# Patient Record
Sex: FEMALE | Race: BLACK OR AFRICAN AMERICAN | NOT HISPANIC OR LATINO | URBAN - METROPOLITAN AREA
[De-identification: names, ages, dates, MRNs, and addresses within clinical notes are randomized per-mention and may not be internally consistent; named-entity substitution may affect disease eponyms.]

---

## 2023-11-07 PROBLEM — Z01.818 PREOPERATIVE CLEARANCE: Status: ACTIVE | Noted: 2023-11-07

## 2023-11-07 PROBLEM — Z13.21 ENCOUNTER FOR VITAMIN DEFICIENCY SCREENING: Status: ACTIVE | Noted: 2023-11-07

## 2023-11-07 PROBLEM — Z98.84 BARIATRIC SURGERY STATUS: Status: ACTIVE | Noted: 2023-11-07

## 2023-11-07 PROBLEM — E66.01 MORBID OBESITY (MULTI): Status: ACTIVE | Noted: 2023-11-07

## 2023-11-07 NOTE — PROGRESS NOTES
Subjective   Date: 11/7/2023 Time: 6:42 PM  Name: Nhi Hua  MRN: [id number]      This is a 28 y.o. female with morbid obesity @BMI@ who presents to clinic for consideration of bariatric surgery. she has attempted and failed multiple diet and exercise regimens for weight loss. Initial Onset of obesity was at age 20's after starting Depo Injections .  Their goal for surgery is to  be healthier . The patient has tried multiple diets to lose weight including  Boot Camps Exercise & Diets . The patient was most successful with the  Boot Camps around 20 lbs but regain once stopped . The most pounds lost on this diet were 20 lbs. The patient considers their dietary weakness to be carbs, fast foods, portions combination The patient reports a  highest weight ever of 270 pounds and lowest weight ever of 250 pounds Distribution of Obesity: is general. Current diet: . Compliance: General Adherence Diet Problems:  feels is well rounded, not a lot of fruit  } Dietary Details Include:Dietary Details: lots of chicken and shrimp.  The patient does not exercise  walks around at hospital works at  Types of Exercise : walking  She notes portions are biggest weakness.  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    Comorbidities: back pain    Menstrual History: irregular    Derrick-en-Y Gastric Bypass      Off PPI for never (how long)    How bad is the heartburn? 0 = No symptoms  Heartburn when lying down? 0 = No symptoms  Heartburn when standing up? 0 = No symptoms  Heartburn after meals? 0 = No symptoms  Does heartburn change your diet? 0 = No symptoms  Does heartburn wake you from sleep? 0 = No symptoms  Do you have difficulty swallowing? 0 = No symptoms  Do you have pain with swallowing? 0 = No symptoms  If you take medication, does this affect your daily life? 0 = No symptoms  How bad is the regurgitation? 0 = No symptoms  Regurgitation when lying down? 0 = No symptoms  Regurgitation when standing up? 0 = No  "symptoms  Regurgitation after meals? 0 = No symptoms  Does regurgitation change your diet? 0 = No symptoms  Does regurgitation wake you from sleep? 0 = No symptoms  How satisfied are you with your present condition? Satisfied    PMH: History reviewed. No pertinent past medical history.     PSH: Denies Surgical Hx    FAMILY HISTORY:  Father:  Diabetes, HTN  Mother:  History Blood Clots    SOCIAL HISTORY:   Smoke Marijuana Daily (not prescribed, no medical card)  Drinks:  socially.    MEDICATIONS:  Prior to Admission Medications:  Medication Documentation Review Audit    **Prior to Admission medications have not yet been reviewed**          ALLERGIES:  Allergies   Allergen Reactions    Penicillins Anaphylaxis, Rash and Itching   itching    REVIEW OF SYSTEMS:  GENERAL: Negative for malaise, significant weight loss and fever  HEAD: Negative for headache, swelling.  NECK: Negative for lumps, goiter, pain and significant neck swelling  RESPIRATORY: Negative for cough, wheezing or shortness of breath.  CARDIOVASCULAR: Negative for chest pain, leg swelling or palpitations.  GI: Negative for abdominal discomfort, blood in stools or black stools or change in bowel habits  : No history of dysuria, frequency or incontinence  MUSCULOSKELETAL: Negative for joint pain or swelling, back pain or muscle pain.  SKIN: Negative for lesions, rash, and itching.  PSYCH: Negative for sleep disturbance, mood disorder and recent psychosocial stressors.  ENDOCRINE: Negative for cold or heat intolerance, polyuria, polydipsia and goiter.    Objective   PHYSICAL EXAM:  Visit Vitals  /81   Pulse 74   Ht 1.575 m (5' 2\")   Wt 115 kg (253 lb)   BMI 46.27 kg/m²   Smoking Status Never   BSA 2.24 m²     General appearance: obese, NAD  Neuro: AOx3  Head: EOMI; no swelling or lesions of scalp or face  ENT:  no lumps or lymphadenopathy, thyroid normal to palpation; oropharynx clear, no swelling or erythema  Skin: warm, no erythema or rashes  Lungs: " clear to percussion and auscultation  Heart: regular rhythm and S1, S2 normal  Abdomen: soft, non-tender, no masses, no organomegaly  Extremities: Normal exam of the extremities. No swelling or pain.  Psych: no hurried speech, no flight of ideas, normal affect    IMPRESSION:  Nhi Hua is a 28 y.o. female with 46 with the following diagnoses and co-morbidities: back pain and large breasts.      We discussed the bypass  at [facility] and all questions were answered. risks and benefits were discussed  The patient understands the risks and benefits of the procedure and how the procedure is performed. The patient understands the risks include but are not limited to bleeding, infection, DVT, PE, pneumonia, myocardial infarction, leak along the staple lines, and weight regain. We discussed lifestyle changes necessary to be successful.        She is a great candidate for weight loss surgery    This patient does meet the criteria for a surgical weight loss procedure according to NIH guidelines.  The risks of sleeve gastrectomy, Derrick-en-Y gastric bypass, and duodenal switch surgery including bleeding, leak, wound infection, dehydration, ulcers, internal hernia, DVT/PE, prolonged nausea/vomiting, incomplete resolution of associated medical conditions, reflux, weight regain, vitamin/mineral deficiencies, and death have been explained to the patient and Nhi Hua has expressed understanding and acceptance of them.     The increased risk of substance and alcohol abuse following bariatric surgery was discussed with the patient, along with the negative consequences of substance/alcohol use after surgery including addiction, worsening of mental health disorders, and injury to the stomach. The risk of smoking and vaping (tobacco or any other substance) after bariatric surgery was explained to the patient. This includes risk of anastamotic ulcers, gastritis, bleeding, perforation, stricture, and PO intolerance.  The patient  expressed understanding and acceptance of these risks.        The benefits of the above surgeries including weight loss, improvement/resolution of associated medical and mental health conditions, improved mobility, and decreased mortality have been explained the the patient and Nhi Hua has expressed understanding and acceptance of them.  Assessment/Plan   PLAN:  The plan of treatment for Nhi Hua is to continue with the consultations and tests ordered today in hopes of qualifying for pre-operative clearance for bariatric surgery. This includes:    Consult Nutrition for education and MSWL  Consult Psychology  Consult Physical Therapy for limited mobility  Consult cardiology  Consult pulmonology  Labs/CXR/EKG ordered  EGD  PCP for medical optimization  Consult sleep medicine - concern for RENETTA    The following are some lifestyle changes you should begin to prepare you for your bypass surgery.   Eliminate soda and other carbonated beverages from your diet. Carbonation will not be well tolerated after surgery. Try Propel, Vitamin Water Zero, Sobe Lifewater, Crystal Light or water.    Increase fluid consumption to 64 oz daily. Do not drink within 30 minutes of eating as this will liquefy your food and make you hungry more quickly.    Exercise for 30-60 minutes daily. Brisk walking, bike riding and swimming are all examples of healthy exercise. If you are unable to exercise we recommend seated exercise.    Do not skip meals.    Take a multivitamin daily.    Lose weight. In preparation for your surgery it is important that you begin making healthier food choices now. Our dietitian will meet with you to help you select foods lower in calories and higher in nutrition. We would like you to lose at least 10  lbs prior to surgery.     Increase your protein intake to 60 grams per day.    Alcohol is empty calories. Please eliminate while preparing for surgery.    Plan your meals.      General Instruction: 1) Use the  information we gave you today to work through your insurance requirements and medical clearances.   2) These documents need to get faxed to the program navigators so they can submit them for approval from your insurance company.   3) Obtain labs today at a  facility. We will call you with any abnormalities and corrections you need to make.   4) Continue to work with your primary care doctor and other specialist so your other health problems are well controlled prior to your surgery.   5) Adopt the recommendations of the program dietician so you develop healthy eating patterns.   6) Work with the sleep team to get your sleep apnea treated to prevent other health problems .   7) Consider attending a support group to learn from other who have been through the process.   8) Come to the MSWL sessions.   45 minutes were spent with patient including history, physical exam, and education.

## 2023-11-08 ENCOUNTER — OFFICE VISIT (OUTPATIENT)
Dept: SURGERY | Facility: CLINIC | Age: 28
End: 2023-11-08
Payer: COMMERCIAL

## 2023-11-08 ENCOUNTER — NUTRITION (OUTPATIENT)
Dept: SURGERY | Facility: CLINIC | Age: 28
End: 2023-11-08
Payer: COMMERCIAL

## 2023-11-08 VITALS
WEIGHT: 253 LBS | HEIGHT: 62 IN | HEART RATE: 74 BPM | SYSTOLIC BLOOD PRESSURE: 128 MMHG | BODY MASS INDEX: 46.56 KG/M2 | DIASTOLIC BLOOD PRESSURE: 81 MMHG

## 2023-11-08 VITALS — WEIGHT: 261 LBS | HEIGHT: 62 IN | BODY MASS INDEX: 48.03 KG/M2

## 2023-11-08 DIAGNOSIS — Z98.84 BARIATRIC SURGERY STATUS: ICD-10-CM

## 2023-11-08 DIAGNOSIS — Z13.21 ENCOUNTER FOR VITAMIN DEFICIENCY SCREENING: ICD-10-CM

## 2023-11-08 DIAGNOSIS — E66.01 MORBID OBESITY (MULTI): ICD-10-CM

## 2023-11-08 DIAGNOSIS — Z01.818 PREOPERATIVE CLEARANCE: ICD-10-CM

## 2023-11-08 PROCEDURE — 99205 OFFICE O/P NEW HI 60 MIN: CPT | Performed by: SURGERY

## 2023-11-08 PROCEDURE — 1036F TOBACCO NON-USER: CPT | Performed by: SURGERY

## 2023-11-08 PROCEDURE — 99215 OFFICE O/P EST HI 40 MIN: CPT | Performed by: SURGERY

## 2023-11-08 RX ORDER — ALBUTEROL SULFATE 90 UG/1
2 AEROSOL, METERED RESPIRATORY (INHALATION) EVERY 4 HOURS PRN
COMMUNITY
Start: 2023-10-18 | End: 2024-10-17

## 2023-11-08 RX ORDER — FLUTICASONE PROPIONATE 110 UG/1
2 AEROSOL, METERED RESPIRATORY (INHALATION) 2 TIMES DAILY
COMMUNITY
Start: 2023-10-18 | End: 2024-05-14

## 2023-11-08 RX ORDER — LANOLIN ALCOHOL/MO/W.PET/CERES
1000 CREAM (GRAM) TOPICAL
COMMUNITY
Start: 2023-02-09

## 2023-11-08 RX ORDER — B-COMPLEX WITH VITAMIN C
TABLET ORAL
COMMUNITY
Start: 2023-11-01 | End: 2024-02-22 | Stop reason: SDUPTHER

## 2023-11-08 RX ORDER — ERGOCALCIFEROL 1.25 MG/1
1 CAPSULE ORAL
COMMUNITY
Start: 2023-10-18 | End: 2024-01-15

## 2023-11-08 ASSESSMENT — LIFESTYLE VARIABLES
AUDIT-C TOTAL SCORE: 1
SKIP TO QUESTIONS 9-10: 1
HOW MANY STANDARD DRINKS CONTAINING ALCOHOL DO YOU HAVE ON A TYPICAL DAY: 1 OR 2
HOW OFTEN DO YOU HAVE A DRINK CONTAINING ALCOHOL: MONTHLY OR LESS
HOW OFTEN DO YOU HAVE SIX OR MORE DRINKS ON ONE OCCASION: NEVER

## 2023-11-08 NOTE — PROGRESS NOTES
"PREOPERATIVE, MULTIDISCIPLINARY, MEDICALLY SUPERVISED, REDUCED CALORIE DIET, BEHAVIOR MODIFICATION AND EXERCISE PROGRAM    S:  Patient has not meet with surgeon yet- has an appt today. Main fluid is water, cranberry juice, inerrable on occasion, lemonade on occasion. Reviewed the correct fluids for post-op. Patient is eating and drinking at same time. Patient was explained the MSWL requirement today in detail.     O:    Wt:WEIGHT@       Ht:    1.575 m (5' 2\")          BMI: BMI@    Goal: 5% body weight loss over the course of program    Dietary recommendation:   1. Eliminate high calorie, carbonated, and caffeinated beverages  2. Practice the 30-30-30 rule by drinking between meals.  3. Structure your meal plan - have 3 meals and 1 snack daily.  4. Have balanced meals that always contain a good source of protein.  5. Increase intake of non-starchy vegetables.  Have 5 servings fruits and vegetables daily.   6. Take a multivitamin daily.  7. Increase physical activity by 10-15 minutes to an end goal of 60 minutes 5 x per week.    Group Topic: Healthy Holiday Meal Planning    Behavioral recommendation: Patient is encouraged to choose healthy foods, along with ways to modify recipes as part of meal planning during the holiday season.     A/P: Pt appears to have a good understanding of how to incorporate health foods as part of holiday meal plans into her daily meal pattern.  Pt has a goal to consume Healthier choices as part of the holiday meal plans in their appropriate portion sizes.    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   Goal is increase steps by 500 each week- long term goal is 10,000 steps a day.     Nadege Prado, FAUSTINO, LD  "

## 2023-11-08 NOTE — PATIENT INSTRUCTIONS
The following are some lifestyle changes you should begin to prepare you for your bypass surgery.   Eliminate soda and other carbonated beverages from your diet. Carbonation will not be well tolerated after surgery. Try Propel, Vitamin Water Zero, Sobe Lifewater, Crystal Light or water.    Increase fluid consumption to 64 oz daily. Do not drink within 30 minutes of eating as this will liquefy your food and make you hungry more quickly.    Exercise for 30-60 minutes daily. Brisk walking, bike riding and swimming are all examples of healthy exercise. If you are unable to exercise we recommend seated exercise.    Do not skip meals.    Take a multivitamin daily.    Lose weight. In preparation for your surgery it is important that you begin making healthier food choices now. Our dietitian will meet with you to help you select foods lower in calories and higher in nutrition. We would like you to lose at least 10  lbs prior to surgery.     Increase your protein intake to 60 grams per day.    Alcohol is empty calories. Please eliminate while preparing for surgery.    Plan your meals.      General Instruction: 1) Use the information we gave you today to work through your insurance requirements and medical clearances.   2) These documents need to get faxed to the program navigators so they can submit them for approval from your insurance company.   3) Obtain labs today at a  facility. We will call you with any abnormalities and corrections you need to make.   4) Continue to work with your primary care doctor and other specialist so your other health problems are well controlled prior to your surgery.   5) Adopt the recommendations of the program dietician so you develop healthy eating patterns.   6) Work with the sleep team to get your sleep apnea treated to prevent other health problems .   7) Consider attending a support group to learn from other who have been through the process.   8) Come to the MSL  sessions.

## 2023-11-17 PROBLEM — E55.9 VITAMIN D INSUFFICIENCY: Status: ACTIVE | Noted: 2023-07-20

## 2023-11-20 ENCOUNTER — APPOINTMENT (OUTPATIENT)
Dept: CARDIOLOGY | Facility: CLINIC | Age: 28
End: 2023-11-20
Payer: COMMERCIAL

## 2023-12-05 ENCOUNTER — OFFICE VISIT (OUTPATIENT)
Dept: CARDIOLOGY | Facility: CLINIC | Age: 28
End: 2023-12-05
Payer: COMMERCIAL

## 2023-12-05 VITALS
BODY MASS INDEX: 47.11 KG/M2 | OXYGEN SATURATION: 97 % | WEIGHT: 256 LBS | SYSTOLIC BLOOD PRESSURE: 154 MMHG | HEIGHT: 62 IN | HEART RATE: 68 BPM | DIASTOLIC BLOOD PRESSURE: 84 MMHG

## 2023-12-05 DIAGNOSIS — E66.01 MORBID OBESITY (MULTI): ICD-10-CM

## 2023-12-05 DIAGNOSIS — J45.909 ASTHMA, UNSPECIFIED ASTHMA SEVERITY, UNSPECIFIED WHETHER COMPLICATED, UNSPECIFIED WHETHER PERSISTENT (HHS-HCC): ICD-10-CM

## 2023-12-05 DIAGNOSIS — Z98.84 BARIATRIC SURGERY STATUS: ICD-10-CM

## 2023-12-05 DIAGNOSIS — Z01.818 PREOPERATIVE CLEARANCE: Primary | ICD-10-CM

## 2023-12-05 DIAGNOSIS — E55.9 VITAMIN D INSUFFICIENCY: ICD-10-CM

## 2023-12-05 PROCEDURE — 93000 ELECTROCARDIOGRAM COMPLETE: CPT | Performed by: INTERNAL MEDICINE

## 2023-12-05 PROCEDURE — 1036F TOBACCO NON-USER: CPT | Performed by: INTERNAL MEDICINE

## 2023-12-05 PROCEDURE — 99203 OFFICE O/P NEW LOW 30 MIN: CPT | Performed by: INTERNAL MEDICINE

## 2023-12-05 NOTE — PROGRESS NOTES
"  Vicente Hua  is a 28 y.o. year old female who presents for bariatric surgery preoperative evlauation    Blood pressure 154/84, pulse 68, height 1.575 m (5' 2\"), weight 116 kg (256 lb), SpO2 97 %.   Penicillins  No past medical history on file.  Past Surgical History:   Procedure Laterality Date    DILATION AND CURETTAGE OF UTERUS       Family History   Problem Relation Name Age of Onset    Blood clot Mother      Diabetes Father      Hypertension Father       @SOC    Current Outpatient Medications   Medication Sig Dispense Refill    albuterol 90 mcg/actuation inhaler Inhale 2 puffs every 4 hours if needed.      cyanocobalamin (Vitamin B-12) 1,000 mcg tablet Take 1 tablet (1,000 mcg) by mouth once daily.      ergocalciferol (Vitamin D-2) 1.25 MG (81391 UT) capsule Take 1 capsule (1,250 mcg) by mouth 1 (one) time per week.      fluticasone (Flovent) 110 mcg/actuation inhaler Inhale 2 puffs twice a day.      PNV no.163-iron-folate no.10 20 mg iron- 1 mg tablet Take 1 tablet by mouth once daily.      Prenatal Vitamin 27 mg iron- 0.8 mg tablet        No current facility-administered medications for this visit.        ROS  Review of Systems    Physical Exam  Physical Exam     EKG  Encounter Date: 12/05/23   ECG 12 Lead    Narrative    NSR at 68/min., WNL       Problem List Items Addressed This Visit       Bariatric surgery status    Relevant Orders    ECG 12 Lead (Completed)    Preoperative clearance - Primary    Morbid obesity (CMS/HCC)     7/19/23 Tchol = 150, HDL = 34, LDL = 83         Vitamin D insufficiency    Asthma         No follow-ups on file.  Proceed with bariatric surgery      Akira Mitchell MD   "

## 2023-12-06 ENCOUNTER — NUTRITION (OUTPATIENT)
Dept: SURGERY | Facility: CLINIC | Age: 28
End: 2023-12-06
Payer: COMMERCIAL

## 2023-12-06 VITALS — BODY MASS INDEX: 46.46 KG/M2 | WEIGHT: 254 LBS

## 2023-12-06 NOTE — PROGRESS NOTES
Initial Bariatric Nutrition Assessment    Surgeon:   Carlos   Patient is considering: RNYGB    ASSESSMENT:  Current weight:   Vitals:    12/06/23 1000   Weight: 115 kg (254 lb)     Ht:      BMI:  Body mass index is 46.46 kg/m².        Initial start weight:   #254  Pre-Op Excess Body Weight (EBW):   #144    Target Post-Op weight goal:    #139-167    Food allergies/intolerances:   No  Chewing/Swallowing/Dentition: No  Nausea / Vomiting / Hx Gastroparesis:  No   Diarrhea/ Constipation: No   Smoking/Tobacco use: yes- aware of need to quit   Vitamins/Minerals supplements: None   Hours of sleep/night: 6-7 hrs   Diet History: boot camps, low carb   Activity Level:walking- tracking steps- 6,000 steps a day     24 HOUR RECALL/DIET HISTORY:  Breakfast:  greek flips yogurt   Snack:  none   Lunch: salad chicken and beans with or skips on occasion   Snack: cheeztiz or none   Dinner: chicken, rice and green beans or cereal or noodles   Snack: none   Beverages: water, juice on occasion   Alcohol: wine or liqure 2 times a week     Person responsible for cooking & shopping?   You   How often do you eat sweet snacks?   Rare   How often do you eat savory snacks?  On occasion   How often do you eat out?   4 times a week   Do you feel overly stuffed?   No   Binge Eating?  No  Night Eating?  No   Emotional Eating?  No        READINESS TO LEARN:  Motivation to learn: Interested        Understanding of instruction: Good      Anticipated Compliance: Good         Family Support: U/A           Educational Materials Provided:    Pre-op Diet and sample menus                                             Nutrition Guidelines for Gastric Bypass and Sleeve Gastrectomy   Schedules for MSWL class and support group     Nutrition assessment completed today. Patient is seeking RNYGB. Is practicing  the 30-30-30 rule. Admits it hard at times but is trying daily. Is drinking at least 64oz a day plus. Main fluid is water. Has not tried the diet juice yet.  Is meal prepping for 3 meals a week. Lives by herself. Patient  is eating 1-2 meals a day and snacks are rare. Listening to body signals to help with portion control. Protein is at most meals when eating. Movement is walking- tracking steps- 6,000 steps a day. Reviewed again the MSWL requirements. Emailed nutrition on to patient- confirmed receipt of info.     Patient was receptive to nutritional recommendations, asked numerous questions, and verbalized understanding of the weight loss surgery diet.  Patient expressed understanding about the importance of strict dietary compliance post-surgery to avoid nutritional deficiencies and achieve optimal weight loss and verbalized intent to follow dietary recommendations.    Malnutrition Screening:   Significant unintentional weight loss? n/a   Eating less than 75% of usual intake for more than 2 weeks? n/a      Nutrition Diagnosis:   Overweight/obesity related to excess energy intake as evidenced by BMI >= 40 kg/m^2.  Food- and nutrition-related knowledge deficit related to lack of prior exposure to surgical weight loss information as evidenced by pt new to surgical program.    Nutrition Interventions:   Modify type and amount of food and nutrients within meals and snacks.  Comprehensive Nutrition Education    Recommendations:  1. Structure meal patterns, eating three meals and 1-2 snacks per day. Try to use a protein shake if needed for a meal replacement. No skipping meals.   2.    Have a good source of protein first at each meal & snack.  Aim for 60-70 grams/day.  3.    Continue to drink 64oz of calorie-free, caffeine-free, and non-carbonated beverages-water, try there diet juice.    4.    Continue to practice these: Stop drinking 30 minutes before meals, nothing with meals and wait 30 minutes after meals to drink again. Make meals last 30 minutes-chew thoroughly.   5.   Increase physical activity by 10-15 minutes as tolerated to an end goal of 60 minutes 5 x per week.  Consistency is the key. Continue to track steps on apple watch. Goal is 10,00 steps a day.     Pre-op Goal weight: lose 5% of body weight    Nutrition Monitoring and Evaluation: 1-2 pound weight loss per week  Criteria: weight check  Need for Follow-up: 4 week (vitamins) #3 of 6     Patient does meet National Institutes Health guidelines for weight loss surgery, however needs to demonstrate consistent effort in making dietary changes before giving clearance. It is anticipated that the patient will need at least 4 nutritional follow-up visits prior to clearance for surgery.        Nadege Prado RDN, LD

## 2023-12-15 ENCOUNTER — DOCUMENTATION (OUTPATIENT)
Dept: SURGERY | Facility: HOSPITAL | Age: 28
End: 2023-12-15
Payer: COMMERCIAL

## 2024-01-11 ENCOUNTER — NUTRITION (OUTPATIENT)
Dept: SURGERY | Facility: CLINIC | Age: 29
End: 2024-01-11
Payer: COMMERCIAL

## 2024-01-11 NOTE — PROGRESS NOTES
Follow-up Pre-op Bariatric Nutrition Assessment    Surgeon:   Carlos   Patient is considering: RNYGB     ASSESSMENT:  Current weight: U/A        Initial start weight:   #254  Pre-Op Excess Body Weight (EBW):   #144    Target Post-Op weight goal:  #139-167       Nutrition Interventions for last encounter (date): 12-6-2023   Structure meal patterns, eating three meals and 1-2 snacks per day. Try to use a protein shake if needed for a meal replacement. No skipping meals.   2.    Have a good source of protein first at each meal & snack.  Aim for 60-70 grams/day.  3.    Continue to drink 64oz of calorie-free, caffeine-free, and non-carbonated beverages-water, try there diet juice.    4.    Continue to practice these: Stop drinking 30 minutes before meals, nothing with meals and wait 30 minutes after meals to drink again. Make meals last 30 minutes-chew thoroughly.   5.   Increase physical activity by 10-15 minutes as tolerated to an end goal of 60 minutes 5 x per week. Consistency is the key. Continue to track steps on apple watch. Goal is 10,00 steps a day.     24 HOUR RECALL/DIET HISTORY:  Breakfast:  boiled egg, yogurt and strawberry toast   Snack:  none   Lunch: salad with chicken or beans on chipotle   Snack: tuna and crackers   Dinner: pepper steak with yellow rice and broccoli or greek yogurt   Snack: none   Beverages: water, juice, hint water   Alcohol: none        READINESS TO LEARN:  Motivation to learn: Interested        Understanding of instruction: Good      Anticipated Compliance: Good          Family Support: None           Educational Materials Provided:    None     Nutrition follow-up assessment completed today. Patient is seeking RNYGB. Trying to have smaller portions. Protein is at all meals. Meals are consistent now- no more skipping meals. Has a protein shake at home to try but higher in kcals and less protein then recommended. Has not printed out the education material- will do today at work.  Is  wearing Apple watch more often- close to closing all rings most days.  Also joined a gym this week. Drinking juice still. But has tried some other flavored water. Patient is taking vitamin D, prenatal MVI, vitamin B12, iron.  Patient is aware of the MSWL requirements and below nutrition goals. Will obtain a weight for next F/U session.     Patient was receptive to nutritional recommendations, asked numerous questions, and verbalized understanding of the weight loss surgery diet.  Patient expressed understanding about the importance of strict dietary compliance post-surgery to avoid nutritional deficiencies and achieve optimal weight loss and verbalized intent to follow dietary recommendations.    Malnutrition Screening:   Significant unintentional weight loss? n/a   Eating less than 75% of usual intake for more than 2 weeks? n/a      Nutrition Diagnosis:   Overweight/obesity related to excess energy intake as evidenced by BMI >= 40 kg/m^2.  Food- and nutrition-related knowledge deficit related to lack of prior exposure to surgical weight loss information as evidenced by pt new to surgical program.    Nutrition Interventions:   Modify type and amount of food and nutrients within meals and snacks.  Comprehensive Nutrition Education    Recommendations:  Continue with structure meal patterns, eating three meals and 1-2 snacks per day.  Refer for the nutrition handout for protein shake options- do  not rebuy the one you have now  3.  Continue to have a good source of protein first at each meal & snack.  Aim for 60-70 grams/day.  4. Continue to drink 64oz of calorie-free, caffeine-free, and non-carbonated beverages- water, flavored water, try now to dilute the juice (1/2 juice, 1/2 water)    5. Continue to work on these: Stop drinking 30 minutes before meals, nothing with meals and wait 30 minutes after meals to drink again. Make meals last 30 minutes-chew thoroughly.   6... Continue with prenatal MVI, vitamin B12, iron.  START 1200-1500mg of calcium citrate per day (500-600mg at lunch, dinner AND before bed),- chewable form    7. Continue to wear the apple watch-close the rings or get to the gym to close the rings.  8. Get a weight for next F/U and print out nutrition material         Pre-op Goal weight: lose 5% of body weight    Nutrition Monitoring and Evaluation: 1-2 pound weight loss per week  Criteria: weight check  Need for Follow-up: 4 week (#4 of 6)     Patient does meet National Institutes Health guidelines for weight loss surgery, however needs to demonstrate consistent effort in making dietary changes before giving clearance. It is anticipated that the patient will need at least 3 nutritional follow-up visits prior to clearance for surgery.      Nadege Prado RDN, LD

## 2024-01-25 ENCOUNTER — DOCUMENTATION (OUTPATIENT)
Dept: SURGERY | Facility: CLINIC | Age: 29
End: 2024-01-25
Payer: COMMERCIAL

## 2024-01-25 NOTE — PROGRESS NOTES
Pt called in and left a vcml asking to be called to schedule. The pt saw the provider in 11/2023. I called the pt and she advised she was attempting to be scheduled for her sleep appointment. I advised her we do not schedule outside of the dept, but I confirmed I see she was able to get her appt scheduled.

## 2024-01-30 ENCOUNTER — APPOINTMENT (OUTPATIENT)
Dept: PULMONOLOGY | Facility: CLINIC | Age: 29
End: 2024-01-30
Payer: COMMERCIAL

## 2024-02-05 ENCOUNTER — APPOINTMENT (OUTPATIENT)
Dept: PULMONOLOGY | Facility: CLINIC | Age: 29
End: 2024-02-05
Payer: COMMERCIAL

## 2024-02-06 ENCOUNTER — TELEMEDICINE (OUTPATIENT)
Dept: BEHAVIORAL HEALTH | Facility: CLINIC | Age: 29
End: 2024-02-06
Payer: COMMERCIAL

## 2024-02-06 DIAGNOSIS — F54 PSYCHOLOGICAL FACTOR AFFECTING PHYSICAL CONDITION: ICD-10-CM

## 2024-02-06 DIAGNOSIS — Z98.84 BARIATRIC SURGERY STATUS: ICD-10-CM

## 2024-02-06 DIAGNOSIS — E66.01 MORBID OBESITY (MULTI): ICD-10-CM

## 2024-02-06 DIAGNOSIS — Z01.818 PREOPERATIVE CLEARANCE: ICD-10-CM

## 2024-02-06 PROCEDURE — 1036F TOBACCO NON-USER: CPT | Performed by: PSYCHOLOGIST

## 2024-02-06 PROCEDURE — 90791 PSYCH DIAGNOSTIC EVALUATION: CPT | Performed by: PSYCHOLOGIST

## 2024-02-06 NOTE — PROGRESS NOTES
[facility]  New Visit Note        HISTORY OF PRESENT ILLNESS     The patient's referring provider is: Ngoc Gonzalez MD MPH    HISTORY OF PRESENT ILLNESS   Nhi Hua is a 28 y.o. female who presents to a [facility] for a sleep medicine evaluation with concerns of No chief complaint on file..     PAST SLEEP HISTORY    Patient has the following sleep-related diagnoses and sleep study results: ***    CURRENT HISTORY    On today's visit, the patient reports ***    STOP  ***  BANG ***    Sleep schedule  on weekdays / work days:  Usual Bedtime  ***  Falls asleep around  ***  Wake time  ***    Sleep schedule  on weekends/non work days :  Usual Bedtime  ***  Falls asleep around ***  Wake time  ***    Naps:   ***    Average sleep duration *** hours/day    Preferred sleeping position: ***    Sleep-related ROS:    Sleep Initiation: {Sleep initiation:38264}    Sleep Maintenance: ***    Breathing during sleep: {Breathing during sleep:75447}    RLS screen:  ***    Sleep-related behaviors:  ***    Recreational drug use  Smoking: ***  Alcohol consumption: ***  Caffeine consumption:  ***  Marijuana: ***    ESS: No data recorded   DEX: ***      REVIEW OF SYSTEMS     All other systems negative      ALLERGIES AND MEDICATIONS     ALLERGIES  Allergies   Allergen Reactions    Penicillins Anaphylaxis, Rash and Itching       MEDICATIONS  Current Outpatient Medications   Medication Sig Dispense Refill    albuterol 90 mcg/actuation inhaler Inhale 2 puffs every 4 hours if needed.      cyanocobalamin (Vitamin B-12) 1,000 mcg tablet Take 1 tablet (1,000 mcg) by mouth once daily.      fluticasone (Flovent) 110 mcg/actuation inhaler Inhale 2 puffs twice a day.      PNV no.163-iron-folate no.10 20 mg iron- 1 mg tablet Take 1 tablet by mouth once daily.      Prenatal Vitamin 27 mg iron- 0.8 mg tablet        No current facility-administered medications for this visit.         PAST  "HISTORY     PAST MEDICAL HISTORY  She  has no past medical history on file.    PAST SURGICAL HISTORY:  Past Surgical History:   Procedure Laterality Date    DILATION AND CURETTAGE OF UTERUS         FAMILY HISTORY  Family History   Problem Relation Name Age of Onset    Blood clot Mother      Diabetes Father      Hypertension Father         SOCIAL HISTORY  She  reports that she has never smoked. She has never used smokeless tobacco. She reports that she does not currently use alcohol. She reports current drug use. Frequency: 7.00 times per week. Drug: Marijuana.       PHYSICAL EXAM     VITAL SIGNS: There were no vitals taken for this visit.     CURRENT WEIGHT: [unfilled]  BMI: [unfilled]  PREVIOUS WEIGHTS:  Wt Readings from Last 3 Encounters:   12/06/23 115 kg (254 lb)   12/05/23 116 kg (256 lb)   11/08/23 115 kg (253 lb)       PHYSICAL EXAM: GENERAL: alert oriented x 3 pleasant and cooperative no acute distress  MODIFIED VASQUEZ SCORE:   MODIFIED MALLAMPATI SCORE:   LATERAL PHARYNGEAL WALL:   TONSILLAR SCORE:   UVULA: midline  TONGUE SCALLOPING: normal  NECK EXAM: normal supple no adenopathy    RESULTS/DATA     No results found for: \"IRON\", \"TRANSFERRIN\", \"IRONSAT\", \"TIBC\", \"FERRITIN\"    ASSESSMENT/PLAN     Ms. Hua is a 28 y.o. female and She was referred to the [facility] for the following issues:                 "

## 2024-02-08 ENCOUNTER — APPOINTMENT (OUTPATIENT)
Dept: SLEEP MEDICINE | Facility: CLINIC | Age: 29
End: 2024-02-08
Payer: COMMERCIAL

## 2024-02-09 NOTE — PROGRESS NOTES
Start time: 1:10pm  End time: 2:00pm    Televideo Informed Consent for psychological evaluation was reviewed with the patient as follows:  There are potential benefits and risks of the use of telephone or video-conferencing that differ from in-person sessions. Specifically, the telephone or televideo system we are using may not be HIPAA compliant and may present limits to patient confidentiality. Confidentiality still applies for telepsychology services, and nobody will record the session without your permission.     Understanding and verbal agreement was attested to by the patient. Patient identity was confirmed using 3 sources, including telephone number, email address and date of birth. Provision of services via telehealth was necessitated by the restrictions on face-to-face visits accompanying the COVID-19 pandemic.    Non-secure Note: The patient has consented to a non-restricted note.    I had the pleasure of seeing ALVARO SEYMOUR at your request for behavioral evaluation for appropriateness for bariatric surgery. As you know, MS. SEYMOUR is a 28 year old who reports a current weight of 253 pounds and a BMI of approximately 46.    MS. SEYMOUR stated that she has all of her clearance appointments scheduled and she has met with the dietitian twice.     WEIGHT HISTORY  MS. SEYMOUR reported that she has struggled with her weight since. She stated that her mother put her into boot camp when she was younger. In high school she weighed around 180lbs and had a more sedentary lifestyle.     She stated that she has attempted the following forms of weight loss: boot camps, exercise (walking), various diets. She described eating habits as the most significant factor that impacts her weight. She stated that her highest weight was around 275 lbs in 2020.     MS. SEYMUOR stated that her appointments with the dietitian are going well. She stated that she is working on 30-30-30, making alternative food choices, smaller portions  and listening to her body. She stated that she is a “picky eater” and will eat the same things. She noted that she has struggled somewhat with the 30-30-30. She reported that she eats a vegetable and protein with every meal she eats. She stated that she is cutting back on her ginger ale and juice intake. She stated that she had some ginger ale last month when she was sick but has significantly reduced this. She is drinking about 4-5 bottles of water per day.     Daily food intake:  Breakfast: yogurt smoothie with an orange   Lunch: Left overs steak broccoli potatoes and shrimp  Dinner: Chicken wings  Snacks: none      PSYCHOSOCIAL HISTORY    MS. SEYMOUR stated that she is working on increasing her steps. She is counting her steps with her apple watch and the goal is 10,000 stops per day. She stated that she is not doing any other exercise or activity. She noted that she needs to get a gym membership.     She noted that she smokes marijuana daily (3-4 blunts per day). She stated that she lost her cousin September and this worsened. She stated that she now smokes about 2 blunts per day and is working on reducing this. She noted that she also eats an edible. She stated that her goal is to completely eliminate this by the end of March beginning of April. She stated that she is not worried about stopping as she has done this before. 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 She stated that she drinks about 7 on the weekends and on Wednesdays she stated that she drinks wine (2 glasses) with her friend. She stated that she is aware that she will not be able to drink alcohol post-surgery. She stated that she has not had concerns related to her alcohol use and she has not had any legal issues related to her alcohol use. She denies use of other substances.     PSYCHOLOGICAL STATUS  MS. SEYMOUR stated that she was diagnosed with depression and she was  prescribed medication by an internal medicine doctor. She stated that she did not recall which medication she was on. She described feeling “numb” and stopped taking this medication. She noted that over time her mood stabilized. She stated that she has never been in therapy.     She processed losing a close family member       Behavioral issues relevant for candidacy for bariatric surgery include:    1) Motivation: MS. SEYMOUR is motivated by a desire to improve her health and to improve her view of self.         IMPRESSIONS  MS. SEYMOUR and I did not have enough time to complete her evaluation. We are scheduled to meet on February 29th at 1pm to complete assessment of the following items:    Substance use assessment  Eating habits  Emotional eating behaviors  Support  Work related history    Additionally, we had an extended discussion about behavioral responses to surgery for which she should be vigilant. We discussed the post-surgical risks of mood deterioration, substance misuse, the development of compulsive behaviors and the development of maladaptive coping responses. She expressed understanding of these risks and agreed to contact our office with appropriate haste if any of these maladaptive responses were to develop after surgery.      Mental Status Examination:    Mental Status Exam:  Orientation:  Alert. Oriented x3.  Memory: intact.  Attention/Concentration: Normal/ Good.  Appearance:  Well-groomed. Casually Dressed. Good hygiene.   Behavior/Attitude: Cooperative. Pleasant. Good eye contact.  Motor: Relaxed. Calm. Normal motor activity.   Speech: Regular rate and volume. Fluent. No pressure.   Mood: Euthymic  Affect: Congruent to stated mood.   Thought process: Goal-directed. Organized.  Thought content: No paranoia, delusion or ideas of reference. No AVH   Suicidal ideation: denied.  Homicidal ideation: denied.   Insight: Good  Judgment: Good  Fund of knowledge: Above Average      Betsy Santacruz  Catracho Lieberman - she  her  hers  Licensed Clinical Psychologist    [facility] [place]   [facility]  Phone: [phone]  [email or web address]

## 2024-02-15 ENCOUNTER — NUTRITION (OUTPATIENT)
Dept: SURGERY | Facility: CLINIC | Age: 29
End: 2024-02-15
Payer: COMMERCIAL

## 2024-02-15 VITALS — BODY MASS INDEX: 45.73 KG/M2 | WEIGHT: 250 LBS

## 2024-02-15 NOTE — PROGRESS NOTES
Follow-up Pre-op Bariatric Nutrition Assessment    Surgeon:   Carlos   Patient is considering: RNYGB     ASSESSMENT:  Current weight:   Vitals:    02/15/24 1030   Weight: 113 kg (250 lb)     Ht:      BMI:  Body mass index is 45.73 kg/m².        Initial start weight:   #254  Pre-Op Excess Body Weight (EBW):   #144   Target Post-Op weight goal:  #139-167       Nutrition Interventions for last encounter (date): 1-  Continue with structure meal patterns, eating three meals and 1-2 snacks per day.  Refer for the nutrition handout for protein shake options- do  not rebuy the one you have now  3.  Continue to have a good source of protein first at each meal & snack.  Aim for 60-70 grams/day.  4. Continue to drink 64oz of calorie-free, caffeine-free, and non-carbonated beverages- water, flavored water, try now to dilute the juice (1/2 juice, 1/2 water)    5. Continue to work on these: Stop drinking 30 minutes before meals, nothing with meals and wait 30 minutes after meals to drink again. Make meals last 30 minutes-chew thoroughly.   6... Continue with prenatal MVI, vitamin B12, iron. START 1200-1500mg of calcium citrate per day (500-600mg at lunch, dinner AND before bed),- chewable form    7. Continue to wear the apple watch-close the rings or get to the gym to close the rings.  8. Get a weight for next F/U and print out nutrition material         24 HOUR RECALL/DIET HISTORY:  Breakfast:  drinkable yogurt and cheese and grapes    Snack:  PB and apple/celery   Lunch: salad (tuna packet/shrimp/chicken)   Snack: none   Dinner: chicken/steak, vegetables  and starch  Snack: none   Beverages: water, diluted juice, flavored water   Alcohol: none        READINESS TO LEARN:  Motivation to learn: Interested        Understanding of instruction: Good       Anticipated Compliance: Good       Family Support: None           Educational Materials Provided:    None     Nutrition follow-up assessment completed today. Patient is  seeking RNYGB. Weight was on home scale today- down #4. Bought the calcium supplements- has not stared yet.  Continues to take all other vitamins needed for post-op. Meals remain consistent- three meals and 1 snack. Protein is consistent at all meals and snacks. Really focusing hard on the protein when eating. Fluids are now correct for post-op. Following the 30-30-30 rule even better. Started back to gym- walking on treadmill now. Closing rings more and more. Reviewed today the 2 week pre-op diet. Patient has all the nutrition info saved in file. Patient is aware of other clearances needed for surgery and scheduled rest of the MS[facility] appt. Provider hopes to be able to clear patient by last MSWL in April. Progressing well and high compliance.     Patient was receptive to nutritional recommendations, asked numerous questions, and verbalized understanding of the weight loss surgery diet.  Patient expressed understanding about the importance of strict dietary compliance post-surgery to avoid nutritional deficiencies and achieve optimal weight loss and verbalized intent to follow dietary recommendations.    Malnutrition Screening:   Significant unintentional weight loss? n/a   Eating less than 75% of usual intake for more than 2 weeks? n/a      Nutrition Diagnosis:   Overweight/obesity related to excess energy intake as evidenced by BMI >= 40 kg/m^2.  Food- and nutrition-related knowledge deficit related to lack of prior exposure to surgical weight loss information as evidenced by pt new to surgical program.    Nutrition Interventions:   Modify type and amount of food and nutrients within meals and snacks.  Comprehensive Nutrition Education    Recommendations:  Continue with the structure meal patterns, eating three meals and 1-2 snacks per day.  2. Continue to have a good source of protein first at each meal & snack.  Aim for 60-70 grams/day. Eat in this order: protein first, veggies/fruit second and starches last   3.  Continue to drink 64oz of calorie-free, caffeine-free, and non-carbonated beverages- water, flavored water,  diluted juice    5. Continue to work on these: Stop drinking 30 minutes before meals, nothing with meals and wait 30 minutes after meals to drink again. Make meals last 30 minutes-chew thoroughly.   6... Continue with prenatal MVI, vitamin B12, iron. OK to start this one post-op:  1200-1500mg of calcium citrate per day (500-600mg at lunch, dinner AND before bed),- chewable form    7. Continue to wear the apple watch-trying to close the rings via work or gym  8. Start the pre-op diet 2 weeks before surgery   Pre-op Goal weight: lose 5% of body weight    Nutrition Monitoring and Evaluation: 1-2 pound weight loss per week  Criteria: weight check  Need for Follow-up: 4 week (Post-op diet progress) # 5 of 6     Patient does meet National [employer] Health guidelines for weight loss surgery, however needs to demonstrate consistent effort in making dietary changes before giving clearance. It is anticipated that the patient will need at least 2 nutritional follow-up visits prior to clearance for surgery.        Nadege Prado RDN, LD

## 2024-02-20 ENCOUNTER — OFFICE VISIT (OUTPATIENT)
Dept: PULMONOLOGY | Facility: CLINIC | Age: 29
End: 2024-02-20
Payer: COMMERCIAL

## 2024-02-20 VITALS
BODY MASS INDEX: 46.93 KG/M2 | DIASTOLIC BLOOD PRESSURE: 77 MMHG | WEIGHT: 255 LBS | OXYGEN SATURATION: 98 % | HEIGHT: 62 IN | TEMPERATURE: 97.5 F | HEART RATE: 84 BPM | SYSTOLIC BLOOD PRESSURE: 114 MMHG | RESPIRATION RATE: 18 BRPM

## 2024-02-20 DIAGNOSIS — E66.01 MORBID OBESITY (MULTI): ICD-10-CM

## 2024-02-20 DIAGNOSIS — Z01.818 PREOPERATIVE CLEARANCE: ICD-10-CM

## 2024-02-20 DIAGNOSIS — J45.20 MILD INTERMITTENT ASTHMA WITHOUT COMPLICATION (HHS-HCC): Primary | ICD-10-CM

## 2024-02-20 DIAGNOSIS — Z98.84 BARIATRIC SURGERY STATUS: ICD-10-CM

## 2024-02-20 PROCEDURE — 99213 OFFICE O/P EST LOW 20 MIN: CPT | Performed by: INTERNAL MEDICINE

## 2024-02-20 PROCEDURE — 1036F TOBACCO NON-USER: CPT | Performed by: INTERNAL MEDICINE

## 2024-02-20 PROCEDURE — 99203 OFFICE O/P NEW LOW 30 MIN: CPT | Performed by: INTERNAL MEDICINE

## 2024-02-20 ASSESSMENT — ENCOUNTER SYMPTOMS
APNEA: 0
SHORTNESS OF BREATH: 0
FEVER: 0
FATIGUE: 0

## 2024-02-20 ASSESSMENT — ASTHMA QUESTIONNAIRES
QUESTION_4 LAST FOUR WEEKS HOW OFTEN HAVE YOU USED YOUR RESCUE INHALER OR NEBULIZER MEDICATION (SUCH AS ALBUTEROL): NOT AT ALL
QUESTION_3 LAST FOUR WEEKS HOW OFTEN DID YOUR ASTHMA SYMPTOMS (WHEEZING, COUGHING, SHORTNESS OF BREATH, CHEST TIGHTNESS OR PAIN) WAKE YOU UP AT NIGHT OR EARLIER THAN USUAL IN THE MORNING: NOT AT ALL
QUESTION_1 LAST FOUR WEEKS HOW MUCH OF THE TIME DID YOUR ASTHMA KEEP YOU FROM GETTING AS MUCH DONE AT WORK, SCHOOL OR AT HOME: NONE OF THE TIME
QUESTION_5 LAST FOUR WEEKS HOW WOULD YOU RATE YOUR ASTHMA CONTROL: COMPLETELY CONTROLLED
QUESTION_2 LAST FOUR WEEKS HOW OFTEN HAVE YOU HAD SHORTNESS OF BREATH: 1 OR 2 TIMES PER WEEK
ACT_TOTALSCORE: 24

## 2024-02-20 ASSESSMENT — PAIN SCALES - GENERAL: PAINLEVEL: 0-NO PAIN

## 2024-02-20 NOTE — PROGRESS NOTES
Department of Medicine  Division of Pulmonary, Critical Care, and Sleep Medicine  Location  [facility], [address]    I was asked by Ngoc Gonzalez MD MPH, to evaluate Nhi Hua for bariatric surgery evaluation. I have independently interviewed and examined the patient in the office and reviewed available records.     Physician HPI (2/20/2024):  28 y.o. year-old female with mild intermittent asthma. She is here as part of her bariatric surgery pre-op evaluation. She denies any shortness of breath unless she laughs a lot. She was told she has exercise-induced asthma back in 2019.  She has been maintained on Flovent and as needed albuterol and has been doing well with this regimen.  She is able to exercise without issues.  She has a significant family history of asthma.  She denies excessive daytime sleepiness.  She has a sleep study scheduled for 2/22/2024.  She does smoke marijuana but denies any tobacco use.  She had 2 prior surgeries requiring general anesthesia, and did well postoperatively without any pulmonary complications.    PMH:  Past Medical History:   Diagnosis Date    Asthma        PSH:  Past Surgical History:   Procedure Laterality Date    DILATION AND CURETTAGE OF UTERUS         FHx:  Family History   Problem Relation Name Age of Onset    Blood clot Mother      Diabetes Father      Hypertension Father         Social Hx:  Social History     Socioeconomic History    Marital status: Single     Spouse name: Not on file    Number of children: Not on file    Years of education: Not on file    Highest education level: Not on file   Occupational History    Not on file   Tobacco Use    Smoking status: Never    Smokeless tobacco: Never   Substance and Sexual Activity    Alcohol use: Not Currently     Comment: Social Drinking not frequently    Drug use: Yes     Frequency: 7.0 times per week     Types: Marijuana     Comment: Non-prescribed, no Medical card    Sexual activity: Not on file  "  Other Topics Concern    Not on file   Social History Narrative    Not on file     Social Determinants of Health     Financial Resource Strain: Not on file   Food Insecurity: Not on file   Transportation Needs: Not on file   Physical Activity: Not on file   Stress: Not on file   Social Connections: Not on file   Intimate Partner Violence: Not on file   Housing Stability: Not on file       Immunization History:    There is no immunization history on file for this patient.    Current Medications:  Current Outpatient Medications   Medication Instructions    albuterol 90 mcg/actuation inhaler 2 puffs, inhalation, Every 4 hours PRN    cyanocobalamin (VITAMIN B-12) 1,000 mcg, oral, Daily RT    fluticasone (Flovent) 110 mcg/actuation inhaler 2 puffs, inhalation, 2 times daily    PNV no.163-iron-folate no.10 20 mg iron- 1 mg tablet 1 tablet, oral, Daily RT    Prenatal Vitamin 27 mg iron- 0.8 mg tablet         Drug Allergies/Intolerances:  Allergies   Allergen Reactions    Penicillins Anaphylaxis, Rash and Itching        Review of Systems:  Review of Systems   Constitutional:  Negative for fatigue and fever.   HENT:  Negative for congestion.    Respiratory:  Negative for apnea and shortness of breath.    Cardiovascular:  Negative for chest pain and leg swelling.        All other review of systems are negative and/or non-contributory.    Physical Examination:  /77 (BP Location: Left arm, Patient Position: Sitting)   Pulse 84   Temp 36.4 °C (97.5 °F) (Temporal)   Resp 18   Ht 1.575 m (5' 2\")   Wt 116 kg (255 lb)   SpO2 98%   BMI 46.64 kg/m²      GEN: appears well. No respiratory distress  EYES: DEMETRIO, EOMI  ENT: Mallampati II,   CV: RRR, no m/g/r  LUNGS: good effort, clear bilaterally, no w/r/r  ABD: Soft, nontender  EXT: no edema, cyanosis, clubbing  NEURO: strength equal bilaterally, sensation grossly intact        Pulmonary Function Test and Sleep Study     none    Exacerbation History     N/A    Chest " "Radiograph     No results found for this or any previous visit from the past 2000 days.      Chest CT Scan     No results found for this or any previous visit from the past 1095 days.       Bronchoscopy     None    Labs     No results found for: \"WBC\", \"HGB\", \"HCT\", \"MCV\", \"PLT\"  No results found for: \"BNP\"  No results found for: \"IGE\", \"EOSABS\", \"[id number]\"      Echocardiogram     No results found for this or any previous visit from the past 365 days.       ACT score     24    ASSESSMENT & PLAN     Problem List Items Addressed This Visit       Bariatric surgery status    Morbid obesity (CMS/[facility])    Overview     Last Assessment & Plan: Formatting of this note might be different from the original. Reviewed reducing starch in diet. 07/10/2018 Formatting of this note might be different from the original. Last Assessment & Plan: Reviewed reducing starch in diet. 07/10/2018         Preoperative clearance        Summary:  28 y.o. year-old female with exercise-induced asthma here for bariatric preop clearance.  Her asthma is under good control.  ACT score today is 24.  She is pending sleep study in 2 days.  No issues with prior anesthesia. ARISCAT score for postoperative pulmonary complications is 0 placing her in low risk (1.6%) category of respiratory failure, respiratory infection, pleural effusion, atelectasis, pneumothorax, bronchospasm, aspiration pneumonitis.    STOP-BANG score is 4 placing her intermediate risk for moderate to severe RENETTA.    Plan:  -Follow-up sleep study results. If positive, will set her up with CPAP equipment. She has also been referred to sleep medicine.  -Encouraged to quit smoking marijuana  -Administer albuterol treatment prior to induction of anesthesia  -Continue ICS in perioperative period.  -Minimize sedatives/narcotics in perioperative period  -As Flovent is being discontinued, patient was instructed to call our office once she is on her last refill, and we will switch her to Arnuity " Ellipta      Follow-up: 5/14/2024 to review CPAP compliance data        Marko Sutton DO  Staff Physician - Pulmonary & Critical Care  02/20/24 1:46 PM  Office number: [phone]   Fax number:  [phone]

## 2024-02-20 NOTE — PROGRESS NOTES
"[facility]  New Visit Note        HISTORY OF PRESENT ILLNESS     The patient's referring provider is: Ngoc Gonzalez MD MPH    HISTORY OF PRESENT ILLNESS   Nhi Hua is a 28 y.o. female who presents to a [facility] for a sleep medicine evaluation with concerns of Consult and Sleep Study.     PAST SLEEP HISTORY    Patient has the following sleep-related diagnoses and sleep study results: none    CURRENT HISTORY    On today's visit, the patient reports she is pursuing bariatric surgery and needs a sleep study.  She does note she snores at night and feels sleepy during the day she wakes up fairly often as well.    She does fall asleep fairly late in the evening and considers herself a night person.    Her father has sleep apnea and uses CPAP.    Neck: 15.5\"    STOP  2  BANG 2    Sleep schedule  on weekdays / work days:  Usual Bedtime  10 p  Falls asleep around  2 a   Wake time  6 a    Sleep schedule  on weekends/non work days :  Usual Bedtime  2 a  Falls asleep around 2 a  Wake time  9 a    Naps:   no    Average sleep duration 5 hours/day    Preferred sleeping position: stomach    Sleep-related ROS:    Sleep Initiation: takes hours to fall asleep weekdays    Sleep Maintenance: wakes a few times per night    Breathing during sleep: snoring    Sleep-related behaviors:  denies    Recreational drug use  Smoking: never  Alcohol consumption: 3/week  Caffeine consumption:  occasional  Marijuana: yes, recreational    ESS: 8  DEX: 10      REVIEW OF SYSTEMS     All other systems negative      ALLERGIES AND MEDICATIONS     ALLERGIES  Allergies   Allergen Reactions    Penicillins Anaphylaxis, Rash and Itching       MEDICATIONS  Current Outpatient Medications   Medication Sig Dispense Refill    albuterol 90 mcg/actuation inhaler Inhale 2 puffs every 4 hours if needed.      cyanocobalamin (Vitamin B-12) 1,000 mcg tablet Take 1 tablet (1,000 mcg) by mouth once " "daily.      fluticasone (Flovent) 110 mcg/actuation inhaler Inhale 2 puffs twice a day.      PNV no.163-iron-folate no.10 20 mg iron- 1 mg tablet Take 1 tablet by mouth once daily.       No current facility-administered medications for this visit.         PAST HISTORY     PAST MEDICAL HISTORY  She  has a past medical history of Asthma.    PAST SURGICAL HISTORY:  Past Surgical History:   Procedure Laterality Date    DILATION AND CURETTAGE OF UTERUS         FAMILY HISTORY  Family History   Problem Relation Name Age of Onset    Blood clot Mother      Diabetes Father      Hypertension Father         SOCIAL HISTORY  She  reports that she has never smoked. She has never used smokeless tobacco. She reports that she does not currently use alcohol after a past usage of about 10.0 standard drinks of alcohol per week. She reports current drug use. Frequency: 7.00 times per week. Drug: Marijuana.       PHYSICAL EXAM     VITAL SIGNS: /79   Pulse 85   Ht 1.575 m (5' 2\")   Wt 115 kg (254 lb)   LMP 02/21/2024 Comment: negative pregnancy test 2/21/24 0948  SpO2 100%   BMI 46.46 kg/m²      CURRENT WEIGHT: [unfilled]  BMI: [unfilled]  PREVIOUS WEIGHTS:  Wt Readings from Last 3 Encounters:   02/22/24 115 kg (254 lb)   02/21/24 116 kg (255 lb)   02/20/24 116 kg (255 lb)       PHYSICAL EXAM: GENERAL: alert oriented x 3 pleasant and cooperative no acute distress  MODIFIED VASQUEZ SCORE: 1+  MODIFIED MALLAMPATI SCORE: 1+  LATERAL PHARYNGEAL WALL: 1+  NECK EXAM: normal supple no adenopathy    RESULTS/DATA     No results found for: \"IRON\", \"TRANSFERRIN\", \"IRONSAT\", \"TIBC\", \"FERRITIN\"    ASSESSMENT/PLAN     Ms. Hua is a 28 y.o. female and She was referred to the [facility] for the following issues:    OBSTRUCTIVE SLEEP APNEA, SUSPECTED / EDS / SURGERY STATUS  -Ordering in-lab sleep test  -Discussed symptoms and risks of untreated sleep apnea    BMI>45  -Body mass index is 46.46 kg/m².  " Today  -Pursuing gastric bypass with Dr. Gonzalez  -Discussed with sufficient weight loss may no longer require treatment for RENETTA    Followup 3 weeks after sleep study to review results

## 2024-02-21 ENCOUNTER — ANESTHESIA (OUTPATIENT)
Dept: GASTROENTEROLOGY | Facility: HOSPITAL | Age: 29
End: 2024-02-21
Payer: COMMERCIAL

## 2024-02-21 ENCOUNTER — HOSPITAL ENCOUNTER (OUTPATIENT)
Dept: GASTROENTEROLOGY | Facility: HOSPITAL | Age: 29
Setting detail: OUTPATIENT SURGERY
Discharge: HOME | End: 2024-02-21
Payer: COMMERCIAL

## 2024-02-21 ENCOUNTER — ANESTHESIA EVENT (OUTPATIENT)
Dept: GASTROENTEROLOGY | Facility: HOSPITAL | Age: 29
End: 2024-02-21
Payer: COMMERCIAL

## 2024-02-21 VITALS
DIASTOLIC BLOOD PRESSURE: 97 MMHG | WEIGHT: 255 LBS | SYSTOLIC BLOOD PRESSURE: 141 MMHG | BODY MASS INDEX: 46.93 KG/M2 | HEIGHT: 62 IN | TEMPERATURE: 97.2 F | RESPIRATION RATE: 18 BRPM | OXYGEN SATURATION: 100 % | HEART RATE: 71 BPM

## 2024-02-21 DIAGNOSIS — Z98.84 BARIATRIC SURGERY STATUS: ICD-10-CM

## 2024-02-21 DIAGNOSIS — Z01.818 PREOPERATIVE CLEARANCE: ICD-10-CM

## 2024-02-21 PROCEDURE — 2500000004 HC RX 250 GENERAL PHARMACY W/ HCPCS (ALT 636 FOR OP/ED): Mod: SE

## 2024-02-21 PROCEDURE — 43239 EGD BIOPSY SINGLE/MULTIPLE: CPT | Performed by: SURGERY

## 2024-02-21 PROCEDURE — 7100000010 HC PHASE TWO TIME - EACH INCREMENTAL 1 MINUTE: Performed by: SURGERY

## 2024-02-21 PROCEDURE — 88305 TISSUE EXAM BY PATHOLOGIST: CPT | Performed by: PATHOLOGY

## 2024-02-21 PROCEDURE — A43239 PR EDG TRANSORAL BIOPSY SINGLE/MULTIPLE

## 2024-02-21 PROCEDURE — 0753T DGTZ GLS MCRSCP SLD LEVEL IV: CPT | Mod: TC,SUR | Performed by: SURGERY

## 2024-02-21 PROCEDURE — 43239 EGD BIOPSY SINGLE/MULTIPLE: CPT | Performed by: STUDENT IN AN ORGANIZED HEALTH CARE EDUCATION/TRAINING PROGRAM

## 2024-02-21 PROCEDURE — 2500000005 HC RX 250 GENERAL PHARMACY W/O HCPCS: Mod: SE

## 2024-02-21 PROCEDURE — 7100000009 HC PHASE TWO TIME - INITIAL BASE CHARGE: Performed by: SURGERY

## 2024-02-21 PROCEDURE — 3700000001 HC GENERAL ANESTHESIA TIME - INITIAL BASE CHARGE: Performed by: SURGERY

## 2024-02-21 PROCEDURE — 3700000002 HC GENERAL ANESTHESIA TIME - EACH INCREMENTAL 1 MINUTE: Performed by: SURGERY

## 2024-02-21 PROCEDURE — A43239 PR EDG TRANSORAL BIOPSY SINGLE/MULTIPLE: Performed by: ANESTHESIOLOGY

## 2024-02-21 RX ORDER — ONDANSETRON HYDROCHLORIDE 2 MG/ML
4 INJECTION, SOLUTION INTRAVENOUS ONCE AS NEEDED
OUTPATIENT
Start: 2024-02-21

## 2024-02-21 RX ORDER — PROPOFOL 10 MG/ML
INJECTION, EMULSION INTRAVENOUS CONTINUOUS PRN
Status: DISCONTINUED | OUTPATIENT
Start: 2024-02-21 | End: 2024-02-21

## 2024-02-21 RX ORDER — ACETAMINOPHEN 325 MG/1
650 TABLET ORAL EVERY 4 HOURS PRN
OUTPATIENT
Start: 2024-02-21

## 2024-02-21 RX ORDER — DEXMEDETOMIDINE HYDROCHLORIDE 4 UG/ML
INJECTION, SOLUTION INTRAVENOUS CONTINUOUS PRN
Status: DISCONTINUED | OUTPATIENT
Start: 2024-02-21 | End: 2024-02-21

## 2024-02-21 RX ORDER — SODIUM CHLORIDE, SODIUM LACTATE, POTASSIUM CHLORIDE, CALCIUM CHLORIDE 600; 310; 30; 20 MG/100ML; MG/100ML; MG/100ML; MG/100ML
100 INJECTION, SOLUTION INTRAVENOUS CONTINUOUS
OUTPATIENT
Start: 2024-02-21

## 2024-02-21 RX ORDER — LIDOCAINE HYDROCHLORIDE 10 MG/ML
0.1 INJECTION INFILTRATION; PERINEURAL ONCE
OUTPATIENT
Start: 2024-02-21 | End: 2024-02-21

## 2024-02-21 RX ORDER — MIDAZOLAM HYDROCHLORIDE 1 MG/ML
INJECTION INTRAMUSCULAR; INTRAVENOUS AS NEEDED
Status: DISCONTINUED | OUTPATIENT
Start: 2024-02-21 | End: 2024-02-21

## 2024-02-21 RX ORDER — ALBUTEROL SULFATE 0.83 MG/ML
2.5 SOLUTION RESPIRATORY (INHALATION) ONCE AS NEEDED
OUTPATIENT
Start: 2024-02-21

## 2024-02-21 RX ORDER — LIDOCAINE HCL/PF 100 MG/5ML
SYRINGE (ML) INTRAVENOUS AS NEEDED
Status: DISCONTINUED | OUTPATIENT
Start: 2024-02-21 | End: 2024-02-21

## 2024-02-21 RX ADMIN — MIDAZOLAM HYDROCHLORIDE 2 MG: 1 INJECTION, SOLUTION INTRAMUSCULAR; INTRAVENOUS at 11:19

## 2024-02-21 RX ADMIN — DEXMEDETOMIDINE HYDROCHLORIDE 0.4 MCG/KG/HR: 4 INJECTION, SOLUTION INTRAVENOUS at 11:24

## 2024-02-21 RX ADMIN — PROPOFOL 200 MCG/KG/MIN: 10 INJECTION, EMULSION INTRAVENOUS at 11:24

## 2024-02-21 RX ADMIN — SODIUM CHLORIDE, SODIUM LACTATE, POTASSIUM CHLORIDE, AND CALCIUM CHLORIDE: 600; 310; 30; 20 INJECTION, SOLUTION INTRAVENOUS at 11:12

## 2024-02-21 RX ADMIN — LIDOCAINE HYDROCHLORIDE 100 MG: 20 INJECTION INTRAVENOUS at 11:24

## 2024-02-21 SDOH — HEALTH STABILITY: MENTAL HEALTH: CURRENT SMOKER: 0

## 2024-02-21 ASSESSMENT — PAIN - FUNCTIONAL ASSESSMENT
PAIN_FUNCTIONAL_ASSESSMENT: 0-10

## 2024-02-21 ASSESSMENT — ENCOUNTER SYMPTOMS
EYES NEGATIVE: 1
ABDOMINAL PAIN: 0
ENDOCRINE NEGATIVE: 1
PSYCHIATRIC NEGATIVE: 1
ABDOMINAL DISTENTION: 0
NEUROLOGICAL NEGATIVE: 1
NAUSEA: 0
ALLERGIC/IMMUNOLOGIC NEGATIVE: 1
VOMITING: 0
CONSTITUTIONAL NEGATIVE: 1
CARDIOVASCULAR NEGATIVE: 1
DIARRHEA: 0
GASTROINTESTINAL NEGATIVE: 1
BACK PAIN: 1
CONSTIPATION: 0
RESPIRATORY NEGATIVE: 1
HEMATOLOGIC/LYMPHATIC NEGATIVE: 1

## 2024-02-21 ASSESSMENT — PAIN SCALES - GENERAL
PAINLEVEL_OUTOF10: 0 - NO PAIN

## 2024-02-21 NOTE — ANESTHESIA POSTPROCEDURE EVALUATION
Patient: Nhi Hua    Procedure Summary       Date: 02/21/24 Room / Location: [facility]    Anesthesia Start: 1122 Anesthesia Stop: 1149    Procedure: EGD Diagnosis:       Bariatric surgery status      Preoperative clearance    Scheduled Providers: Ngoc Gonzalez MD MPH; Lizz Krause RN; Elin Yee MD Responsible Provider: Elin Yee MD    Anesthesia Type: MAC ASA Status: 2            Anesthesia Type: MAC    Vitals Value Taken Time   /97 02/21/24 1213   Temp 36.2 °C (97.2 °F) 02/21/24 1139   Pulse 71 02/21/24 1213   Resp 18 02/21/24 1213   SpO2 100 % 02/21/24 1213       Anesthesia Post Evaluation    Patient location during evaluation: PACU  Patient participation: complete - patient participated  Level of consciousness: awake and alert  Pain management: adequate  Airway patency: patent  Cardiovascular status: acceptable  Respiratory status: acceptable  Hydration status: acceptable  Postoperative Nausea and Vomiting: none        No notable events documented.

## 2024-02-21 NOTE — H&P
History Of Present Illness  Nhi Hua is a 28 y.o. female here for EGD prior to planned bypass.      Past Medical History  Past Medical History:   Diagnosis Date    Asthma        Surgical History  Past Surgical History:   Procedure Laterality Date    DILATION AND CURETTAGE OF UTERUS          Social History  She reports that she has never smoked. She has never used smokeless tobacco. She reports that she does not currently use alcohol. She reports current drug use. Frequency: 7.00 times per week. Drug: Marijuana.    Family History  Family History   Problem Relation Name Age of Onset    Blood clot Mother      Diabetes Father      Hypertension Father          Allergies  Penicillins    Review of Systems   Constitutional: Negative.    HENT: Negative.     Eyes: Negative.    Respiratory: Negative.     Cardiovascular: Negative.    Gastrointestinal: Negative.  Negative for abdominal distention, abdominal pain, constipation, diarrhea, nausea and vomiting.   Endocrine: Negative.    Genitourinary: Negative.    Musculoskeletal:  Positive for back pain.   Skin: Negative.    Allergic/Immunologic: Negative.    Neurological: Negative.    Hematological: Negative.    Psychiatric/Behavioral: Negative.          Physical Exam  Vitals reviewed.   Constitutional:       Appearance: Normal appearance. She is obese.   HENT:      Head: Normocephalic and atraumatic.      Nose: Nose normal.   Eyes:      Extraocular Movements: Extraocular movements intact.      Pupils: Pupils are equal, round, and reactive to light.   Cardiovascular:      Rate and Rhythm: Normal rate and regular rhythm.   Pulmonary:      Effort: Pulmonary effort is normal.   Abdominal:      General: Abdomen is flat. There is no distension.      Palpations: Abdomen is soft. There is no mass.      Tenderness: There is no abdominal tenderness.   Musculoskeletal:         General: Normal range of motion.      Cervical back: Normal range of motion and neck supple.   Skin:      "General: Skin is warm and dry.      Capillary Refill: Capillary refill takes less than 2 seconds.   Neurological:      General: No focal deficit present.      Mental Status: She is alert and oriented to person, place, and time.   Psychiatric:         Mood and Affect: Mood normal.         Behavior: Behavior normal.         Thought Content: Thought content normal.         Judgment: Judgment normal.          Last Recorded Vitals  Blood pressure 139/83, pulse 70, temperature 36.1 °C (97 °F), resp. rate 16, height 1.575 m (5' 2\"), weight 116 kg (255 lb), last menstrual period 02/21/2024, SpO2 100 %.       Assessment/Plan   Active Problems:  There are no active Hospital Problems.    28 y.o. female here for EGD prior to planned bypass.     -NPO, IVF  -Consented  -outpatient      Norma Giron MD    "

## 2024-02-21 NOTE — ANESTHESIA PREPROCEDURE EVALUATION
"Patient: Nhi Hua    Procedure Information       Date/Time: 02/21/24 1030    Scheduled providers: Ngoc Gonzalez MD MPH; Lizz Krause RN; Elin Yee MD    Procedure: EGD    Location: [facility]          28 y.o. year-old female with mild intermittent asthma. She is here as part of her bariatric surgery pre-op evaluation. She denies any shortness of breath unless she laughs a lot. She was told she has exercise-induced asthma back in 2019.  She has been maintained on Flovent and as needed albuterol and has been doing well with this regimen.  She is able to exercise without issues.     Relevant Problems   Endocrine   (+) Morbid obesity (CMS/HCC)      Pulmonary   (+) Asthma   Last Albuterol use was Oct 2023    Clinical information reviewed:                   NPO Detail:  No data recorded     There were no vitals filed for this visit.    Past Surgical History:   Procedure Laterality Date    DILATION AND CURETTAGE OF UTERUS       Past Medical History:   Diagnosis Date    Asthma        Current Outpatient Medications:     albuterol 90 mcg/actuation inhaler, Inhale 2 puffs every 4 hours if needed., Disp: , Rfl:     cyanocobalamin (Vitamin B-12) 1,000 mcg tablet, Take 1 tablet (1,000 mcg) by mouth once daily., Disp: , Rfl:     fluticasone (Flovent) 110 mcg/actuation inhaler, Inhale 2 puffs twice a day., Disp: , Rfl:     PNV no.163-iron-folate no.10 20 mg iron- 1 mg tablet, Take 1 tablet by mouth once daily., Disp: , Rfl:     Prenatal Vitamin 27 mg iron- 0.8 mg tablet, , Disp: , Rfl:   Allergies   Allergen Reactions    Penicillins Anaphylaxis, Rash and Itching     Social History     Tobacco Use    Smoking status: Never    Smokeless tobacco: Never   Substance Use Topics    Alcohol use: Not Currently     Comment: Social Drinking not frequently           No results found for: \"WBC\", \"HGB\", \"HCT\", \"PLT\"  No results found for: \"PROTIME\", \"PTT\", \"INR\"  Encounter Date: 12/05/23   ECG 12 Lead    Narrative "    NSR at 68/min., WNL     No results found for this or any previous visit from the past 1095 days.        NPO Detail:  No data recorded     Review of Systems    Physical Exam    Airway  Mallampati: III  TM distance: >3 FB  Neck ROM: full     Cardiovascular   Rhythm: regular  Rate: normal     Dental    Pulmonary - normal exam     Abdominal - normal exam  (+) obese       Other findings: Multiple piercings with jewelry in place          Anesthesia Plan    History of general anesthesia?: yes  History of complications of general anesthesia?: no    ASA 2     MAC   (GA-TIVA  Patient advised to remove jewelry.  Discussed risks of abrasion/burns with jewelry that remains on. )  The patient is not a current smoker.    intravenous induction   Anesthetic plan and risks discussed with patient.    Plan discussed with CRNA, CAA and attending.

## 2024-02-22 ENCOUNTER — OFFICE VISIT (OUTPATIENT)
Dept: SLEEP MEDICINE | Facility: CLINIC | Age: 29
End: 2024-02-22
Payer: COMMERCIAL

## 2024-02-22 VITALS
HEIGHT: 62 IN | HEART RATE: 85 BPM | BODY MASS INDEX: 46.74 KG/M2 | WEIGHT: 254 LBS | SYSTOLIC BLOOD PRESSURE: 114 MMHG | OXYGEN SATURATION: 100 % | DIASTOLIC BLOOD PRESSURE: 79 MMHG

## 2024-02-22 DIAGNOSIS — Z01.818 PREOPERATIVE CLEARANCE: ICD-10-CM

## 2024-02-22 DIAGNOSIS — E66.01 MORBID OBESITY (MULTI): ICD-10-CM

## 2024-02-22 DIAGNOSIS — G47.19 EXCESSIVE DAYTIME SLEEPINESS: ICD-10-CM

## 2024-02-22 DIAGNOSIS — Z98.84 BARIATRIC SURGERY STATUS: ICD-10-CM

## 2024-02-22 DIAGNOSIS — G47.30 SLEEP-RELATED BREATHING DISORDER: Primary | ICD-10-CM

## 2024-02-22 PROCEDURE — 99213 OFFICE O/P EST LOW 20 MIN: CPT | Performed by: PHYSICIAN ASSISTANT

## 2024-02-22 PROCEDURE — 1036F TOBACCO NON-USER: CPT | Performed by: PHYSICIAN ASSISTANT

## 2024-02-22 PROCEDURE — 99203 OFFICE O/P NEW LOW 30 MIN: CPT | Performed by: PHYSICIAN ASSISTANT

## 2024-02-22 ASSESSMENT — SLEEP AND FATIGUE QUESTIONNAIRES
SATISFACTION_WITH_CURRENT_SLEEP_PATTERN: SATISFIED
ESS-CHAD TOTAL SCORE: 8
HOW LIKELY ARE YOU TO NOD OFF OR FALL ASLEEP WHEN YOU ARE A PASSENGER IN A CAR FOR AN HOUR WITHOUT A BREAK: SLIGHT CHANCE OF DOZING
HOW LIKELY ARE YOU TO NOD OFF OR FALL ASLEEP WHILE LYING DOWN TO REST IN THE AFTERNOON WHEN CIRCUMSTANCES PERMIT: SLIGHT CHANCE OF DOZING
SLEEP_PROBLEM_NOTICEABLE_TO_OTHERS: VERY MUCH NOTICEABLE
WORRIED_DISTRESSED_DUE_TO_SLEEP: A LITTLE
DIFFICULTY_STAYING_ASLEEP: MILD
HOW LIKELY ARE YOU TO NOD OFF OR FALL ASLEEP WHILE WATCHING TV: HIGH CHANCE OF DOZING
HOW LIKELY ARE YOU TO NOD OFF OR FALL ASLEEP WHILE SITTING AND READING: SLIGHT CHANCE OF DOZING
HOW LIKELY ARE YOU TO NOD OFF OR FALL ASLEEP IN A CAR, WHILE STOPPED FOR A FEW MINUTES IN TRAFFIC: WOULD NEVER DOZE
DIFFICULTY_FALLING_ASLEEP: MODERATE
SITING INACTIVE IN A PUBLIC PLACE LIKE A CLASS ROOM OR A MOVIE THEATER: SLIGHT CHANCE OF DOZING
HOW LIKELY ARE YOU TO NOD OFF OR FALL ASLEEP WHILE SITTING AND TALKING TO SOMEONE: WOULD NEVER DOZE
SLEEP_PROBLEM_INTERFERES_DAILY_ACTIVITIES: NOT AT ALL NOTICEABLE
HOW LIKELY ARE YOU TO NOD OFF OR FALL ASLEEP WHILE SITTING QUIETLY AFTER LUNCH WITHOUT ALCOHOL: SLIGHT CHANCE OF DOZING

## 2024-02-22 ASSESSMENT — PATIENT HEALTH QUESTIONNAIRE - PHQ9
2. FEELING DOWN, DEPRESSED OR HOPELESS: NOT AT ALL
SUM OF ALL RESPONSES TO PHQ9 QUESTIONS 1 & 2: 0
1. LITTLE INTEREST OR PLEASURE IN DOING THINGS: NOT AT ALL

## 2024-02-22 ASSESSMENT — PAIN SCALES - GENERAL: PAINLEVEL: 0-NO PAIN

## 2024-02-22 ASSESSMENT — LIFESTYLE VARIABLES
AUDIT-C TOTAL SCORE: 9
HOW OFTEN DO YOU HAVE SIX OR MORE DRINKS ON ONE OCCASION: WEEKLY
HOW MANY STANDARD DRINKS CONTAINING ALCOHOL DO YOU HAVE ON A TYPICAL DAY: 10 OR MORE
HOW OFTEN DO YOU HAVE A DRINK CONTAINING ALCOHOL: 2-4 TIMES A MONTH
SKIP TO QUESTIONS 9-10: 0

## 2024-02-22 NOTE — PATIENT INSTRUCTIONS
Thank you for coming to the Sleep Medicine Clinic today! Your sleep medicine provider today was: Alvin Campa PA-C Below is a summary of your treatment plan, other important information, and our contact numbers:      TREATMENT PLAN     Call [phone]), option 3 to schedule your sleep study. When you have an appointment please call us back at [phone] to schedule a followup appointment 3-4 weeks after to review results.    Obstructive Sleep Apnea (RENETTA) is a sleep disorder where your upper airway muscles relax during sleep and the airway intermittently and repetitively narrows and collapses leading to partially blocked airway (hypopnea) or completely blocked airway (apnea) which, in turn, can disrupt breathing in sleep, lower oxygen levels while you sleep and cause night time wakings. Because both apnea and hypopnea may cause higher carbon dioxide or low oxygen levels, untreated RENETTA can lead to heart arrhythmia, elevation of blood pressure, and make it harder for the body to consolidate memory and facilitate metabolism (leading to higher blood sugars at night). Frequent partial arousals occur during sleep resulting in sleep deprivation and daytime sleepiness. RENETTA is associated with an increased risk of cardiovascular disease, stroke, hypertension, and insulin resistance. Moreover, untreated RENETTA with excessive daytime sleepiness can increase the risk of motor vehicular accidents.    Some conservative strategies for RENETTA regardless of RENETTA severity are:   Positional therapy - Avoid sleeping on your back.   Healthy diet and regular exercise to optimize weight is highly encouraged.   Avoid alcohol late in the evening and sedative-hypnotics as these substances can make sleep apnea worse.   Improve breathing through the nose with intranasal steroid spray, saline rinse, or antihistamines    Safety: Avoid driving vehicle and operating heavy equipment while sleepy. Drowsy driving may lead to life-threatening  motor vehicle accidents. A person driving while sleepy is 5 times more likely to have an accident. If you feel sleepy, pull over and take a short power nap (sleep for less than 30 minutes). Otherwise, ask somebody to drive you.    Treatment options for sleep apnea include weight management, positional therapy, Positive Airway Therapy (PAP) therapy, oral appliance therapy, hypoglossal nerve stimulator (Inspire) and select airway surgeries.      OUR SLEEP TESTING LOCATIONS     Our team will contact you to schedule your sleep study, however, you can contact us as follow:  Main Phone Line (scheduling only): [phone]), option 3  Adult and Pediatric Locations  [facility] (6 years and older): [facility] - 4th floor ([address] [facility]) After hours line: [phone]  [facility] (Main campus: All ages): [facility], 6th floor. After hours line: [phone]   [place] (18 years and older): [address] [facility], 2nd floor   [place] (18 years and older): [address] [facility]; 4th floor  After hours line: [phone]  [facility] (18 years and older) at [place]: [address] [facility]  After hours line: [phone]    [place] (5 years and older; younger considered on case-by-case basis): [address] [facility]; [address], [address]. Scheduling  After hours line: [phone]   [place] (6 years and older): [address]; [address]   [place] (6 years and older): [address] [facility], [address]  After hours line: [phone]   [place] (13 years and older) in [place]: [address], 2nd floor  After hours line: [phone]   [place] (13 year and older): [address]  After hours line: [phone]      IMPORTANT PHONE NUMBERS     Sleep Medicine Clinic Fax: [phone]  Appointments (for Adult Sleep Clinic): [phone] - option 2  Appointments (For Sleep Studies):  "[phone] - option 3  Behavioral Sleep Medicine: [phone]    [employer]): [phone]  For clinical questions and refilling prescriptions: [phone]  Jaycee Arnold (For [place]): P: [phone]  F: [phone]       CONTACTING YOUR SLEEP MEDICINE PROVIDER     Send a message directly to your provider through \"My Chart\", which is the email service through your  Records Account: [email or web address]   Call [phone] and leave a message. One of the administrative assistants will forward the message to your sleep medicine provider through \"My Chart\" and/or email.     Your sleep medicine provider for this visit was: Alvin Campa PA-C    "

## 2024-02-27 LAB
LABORATORY COMMENT REPORT: NORMAL
PATH REPORT.FINAL DX SPEC: NORMAL
PATH REPORT.GROSS SPEC: NORMAL
PATH REPORT.TOTAL CANCER: NORMAL

## 2024-02-29 ENCOUNTER — TELEMEDICINE (OUTPATIENT)
Dept: BEHAVIORAL HEALTH | Facility: CLINIC | Age: 29
End: 2024-02-29
Payer: COMMERCIAL

## 2024-02-29 DIAGNOSIS — E66.01 MORBID OBESITY (MULTI): ICD-10-CM

## 2024-02-29 DIAGNOSIS — F54 PSYCHOLOGICAL FACTOR AFFECTING PHYSICAL CONDITION: ICD-10-CM

## 2024-02-29 PROCEDURE — 90837 PSYTX W PT 60 MINUTES: CPT | Mod: AH,GT | Performed by: PSYCHOLOGIST

## 2024-02-29 PROCEDURE — 1036F TOBACCO NON-USER: CPT | Performed by: PSYCHOLOGIST

## 2024-02-29 ASSESSMENT — PATIENT HEALTH QUESTIONNAIRE - PHQ9
SUM OF ALL RESPONSES TO PHQ9 QUESTIONS 1 & 2: 2
5. POOR APPETITE OR OVEREATING: NOT AT ALL
3. TROUBLE FALLING OR STAYING ASLEEP: MORE THAN HALF THE DAYS
9. THOUGHTS THAT YOU WOULD BE BETTER OFF DEAD, OR OF HURTING YOURSELF: NOT AT ALL
2. FEELING DOWN, DEPRESSED OR HOPELESS: SEVERAL DAYS
SUM OF ALL RESPONSES TO PHQ QUESTIONS 1-9: 7
7. TROUBLE CONCENTRATING ON THINGS, SUCH AS READING THE NEWSPAPER OR WATCHING TELEVISION: NOT AT ALL
6. FEELING BAD ABOUT YOURSELF - OR THAT YOU ARE A FAILURE OR HAVE LET YOURSELF OR YOUR FAMILY DOWN: MORE THAN HALF THE DAYS
1. LITTLE INTEREST OR PLEASURE IN DOING THINGS: SEVERAL DAYS
8. MOVING OR SPEAKING SO SLOWLY THAT OTHER PEOPLE COULD HAVE NOTICED. OR THE OPPOSITE, BEING SO FIGETY OR RESTLESS THAT YOU HAVE BEEN MOVING AROUND A LOT MORE THAN USUAL: NOT AT ALL
4. FEELING TIRED OR HAVING LITTLE ENERGY: SEVERAL DAYS
10. IF YOU CHECKED OFF ANY PROBLEMS, HOW DIFFICULT HAVE THESE PROBLEMS MADE IT FOR YOU TO DO YOUR WORK, TAKE CARE OF THINGS AT HOME, OR GET ALONG WITH OTHER PEOPLE: NOT DIFFICULT AT ALL

## 2024-02-29 ASSESSMENT — ANXIETY QUESTIONNAIRES
5. BEING SO RESTLESS THAT IT IS HARD TO SIT STILL: NOT AT ALL
2. NOT BEING ABLE TO STOP OR CONTROL WORRYING: NOT AT ALL
1. FEELING NERVOUS, ANXIOUS, OR ON EDGE: NOT AT ALL
6. BECOMING EASILY ANNOYED OR IRRITABLE: SEVERAL DAYS
4. TROUBLE RELAXING: SEVERAL DAYS
IF YOU CHECKED OFF ANY PROBLEMS ON THIS QUESTIONNAIRE, HOW DIFFICULT HAVE THESE PROBLEMS MADE IT FOR YOU TO DO YOUR WORK, TAKE CARE OF THINGS AT HOME, OR GET ALONG WITH OTHER PEOPLE: NOT DIFFICULT AT ALL
3. WORRYING TOO MUCH ABOUT DIFFERENT THINGS: NOT AT ALL
7. FEELING AFRAID AS IF SOMETHING AWFUL MIGHT HAPPEN: MORE THAN HALF THE DAYS
GAD7 TOTAL SCORE: 4

## 2024-02-29 ASSESSMENT — LIFESTYLE VARIABLES
HOW OFTEN DO YOU HAVE A DRINK CONTAINING ALCOHOL: 2-3 TIMES A WEEK
HOW OFTEN DO YOU HAVE SIX OR MORE DRINKS ON ONE OCCASION: MONTHLY
ABLE TO STOP USING DRUGS WHEN WANT: NO
ENGAGED IN ILLEGAL ACTIVITIES TO OBTAIN DRUGS: NO
AUDIT-C TOTAL SCORE: 7
HOW OFTEN DURING THE LAST YEAR HAVE YOU BEEN UNABLE TO REMEMBER WHAT HAPPENED THE NIGHT BEFORE BECAUSE YOU HAD BEEN DRINKING: NEVER
SKIP TO QUESTIONS 9-10: 0
MISUSED PRESCRIPTION DRUGS: YES
USE MORE THAN ONE DRUG AT A TIME: NO
HAVE YOU OR SOMEONE ELSE BEEN INJURED AS A RESULT OF YOUR DRINKING: NO
AUDIT-C TOTAL SCORE: 7
HOW OFTEN DURING THE LAST YEAR HAVE YOU FAILED TO DO WHAT WAS NORMALLY EXPECTED FROM YOU BECAUSE OF DRINKING: NEVER
EXPERIENCED WITHDRAWAL SYMPTOMS DUE TO HEAVY DRUG INTAKE: NO
NEGLECTED FAMILY OR WORK DUE TO DRUG USE: NO
FEEL BAD ABOUT YOUR DRUG USE: YES
MEDICAL PROBLEMS AS RESULT OF DRUG USE: NO
SPOUSE OR PARENTS COMPLAIN ABOUT INVOLVEMENT WITH DRUGS: NO
AUDIT TOTAL SCORE: 6
HOW MANY STANDARD DRINKS CONTAINING ALCOHOL DO YOU HAVE ON A TYPICAL DAY: 5 OR 6
HOW OFTEN DURING THE LAST YEAR HAVE YOU NEEDED AN ALCOHOLIC DRINK FIRST THING IN THE MORNING TO GET YOURSELF GOING AFTER A NIGHT OF HEAVY DRINKING: NEVER
HOW OFTEN DURING THE LAST YEAR HAVE YOU HAD A FEELING OF GUILT OR REMORSE AFTER DRINKING: MONTHLY
AUDIT TOTAL SCORE: 13
HOW OFTEN DURING THE LAST YEAR HAVE YOU FOUND THAT YOU WERE NOT ABLE TO STOP DRINKING ONCE YOU HAD STARTED: NEVER
DAST10 TOTAL SCORE: 3
BLACKOUTS OR FLASHBACKS DUE TO DRUG USE: NO
HAS A RELATIVE, FRIEND, DOCTOR, OR ANOTHER HEALTH PROFESSIONAL EXPRESSED CONCERN ABOUT YOUR DRINKING OR SUGGESTED YOU CUT DOWN: YES, DURING THE LAST YEAR

## 2024-02-29 NOTE — PROGRESS NOTES
"Start time: 1:00pm  End time: 1:38pm    Nhi Hua and I met in order to complete psychiatric evaluation for bariatric surgery.     She stated that things are going well with her dietary changes. She stated that she is drinking more water and reducing juice and pop. She has eliminated pop and most juices.     She stated that she has engaged in emotional eating in the past. She noted that following her miscarriage she struggled with this as well as increase in her cannabis use. She stated that she stopped emotional eating toward the end of 2021 and found other ways to cope with emotions. She reported that she kept herself busy and distracted, which was helpful (e.g., driving, going to the lake).     She denies restrictive behaviors, purging, over-exercising, and laxative use. She stated that she has engaged in night time eating. She stated that if she does not eat throughout the day, she will eat more at night. She stated that this behavior has lessened since being more structured with her diet. She denies binge eating episodes and grazing.     Support: She reported that her mother and her best friend are her main support. She shared that they are supportive of her decision to have weight loss surgery. She discussed some of her concerns her mother has but stated that her mother continues to help and support her.     She reported that she is using marijuana about three days per week (three blunts per day). She stated that she is continuing to drink alcohol on the weekends; 5 drinks per day on Friday and Saturday (e.g., wine or mixed drinks). Per assessment, she stated that her drinking has not caused her issues with family or friends and that she has not had difficult stopping in the past.    She described her mood as \"ok.\" She stated that she listens to music to help cope with stressors and to \"cheer up.\" She is not taking medications for psychiatric reasons.     We discussed referral to therapy provider that can " assist MS. SEYMOUR with mood management and elimination of  cannabis and alcohol use. Once care is established, MS. SEYMOUR and I will follow-up to review progress.       Betsy Santacruz PsyD        Initial Evaluation:    Televideo Informed Consent for psychological evaluation was reviewed with the patient as follows:  There are potential benefits and risks of the use of telephone or video-conferencing that differ from in-person sessions. Specifically, the telephone or televideo system we are using may not be HIPAA compliant and may present limits to patient confidentiality. Confidentiality still applies for telepsychology services, and nobody will record the session without your permission.     Understanding and verbal agreement was attested to by the patient. Patient identity was confirmed using 3 sources, including telephone number, email address and date of birth. Provision of services via telehealth was necessitated by the restrictions on face-to-face visits accompanying the COVID-19 pandemic.    Non-secure Note: The patient has consented to a non-restricted note.    I had the pleasure of seeing ALVARO SEYMOUR at your request for behavioral evaluation for appropriateness for bariatric surgery. As you know, MS. SEYMOUR is a 28 year old who reports a current weight of 253 pounds and a BMI of approximately 46.    MS. SEYMOUR stated that she has all of her clearance appointments scheduled and she has met with the dietitian twice.     WEIGHT HISTORY  MS. SEYMOUR reported that she has struggled with her weight since. She stated that her mother put her into boot camp when she was younger. In high school she weighed around 180lbs and had a more sedentary lifestyle.     She stated that she has attempted the following forms of weight loss: boot camps, exercise (walking), various diets, detox teas. She described eating habits as the most significant factor that impacts her weight. She stated that her highest weight was  around 275 lbs in 2020.     MS. SEYMOUR stated that her appointments with the dietitian are going well. She stated that she is working on 30-30-30, making alternative food choices, smaller portions and listening to her body. She stated that she is a “picky eater” and will eat the same things. She noted that she has struggled somewhat with the 30-30-30. She reported that she eats a vegetable and protein with every meal she eats. She stated that she is cutting back on her ginger ale and juice intake. She stated that she had some ginger ale last month when she was sick but has significantly reduced this. She is drinking about 4-5 bottles of water per day.     Daily food intake:  Breakfast: yogurt smoothie with an orange   Lunch: Left overs steak broccoli potatoes and shrimp  Dinner: Chicken wings  Snacks: none      PSYCHOSOCIAL HISTORY    MS. SEYMOUR stated that she is working on increasing her steps. She is counting her steps with her apple watch and the goal is 10,000 stops per day. She stated that she is not doing any other exercise or activity. She noted that she needs to get a gym membership.     She noted that she smokes marijuana daily (3-4 blunts per day). She stated that she lost her cousin September and this worsened. She stated that she now smokes about 2 blunts per day and is working on reducing this. She noted that she also eats an edible. She stated that her goal is to completely eliminate this by the end of March beginning of April. She stated that she is not worried about stopping as she has done this before. 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 She stated that she drinks about 7 on the weekends and on Wednesdays she stated that she drinks wine (2 glasses) with her friend. She stated that she is aware that she will not be able to drink alcohol post-surgery. She stated that she has not had concerns related to her alcohol use and she  has not had any legal issues related to her alcohol use. She denies use of other substances.     PSYCHOLOGICAL STATUS  MS. SEYMOUR stated that she was diagnosed with depression and she was prescribed medication by an internal medicine doctor. She stated that she did not recall which medication she was on. She described feeling “numb” and stopped taking this medication. She noted that over time her mood stabilized. She stated that she has never been in therapy.     She processed losing a close family member and the impact on her mental health.       Behavioral issues relevant for candidacy for bariatric surgery include:    1) Motivation: MS. SEYMOUR is motivated by a desire to improve her health and to improve her view of self.     2)         IMPRESSIONS  MS. SEYMOUR and IRIS did not have enough time to complete her evaluation. We are scheduled to meet on February 29th at 1pm to complete assessment of the following items:    Substance use assessment  Eating habits  Emotional eating behaviors  Support  Work related history    Additionally, we had an extended discussion about behavioral responses to surgery for which she should be vigilant. We discussed the post-surgical risks of mood deterioration, substance misuse, the development of compulsive behaviors and the development of maladaptive coping responses. She expressed understanding of these risks and agreed to contact our office with appropriate haste if any of these maladaptive responses were to develop after surgery.      Mental Status Examination:    Mental Status Exam:  Orientation:  Alert. Oriented x3.  Memory: intact.  Attention/Concentration: Normal/ Good.  Appearance:  Well-groomed. Casually Dressed. Good hygiene.   Behavior/Attitude: Cooperative. Pleasant. Good eye contact.  Motor: Relaxed. Calm. Normal motor activity.   Speech: Regular rate and volume. Fluent. No pressure.   Mood: Euthymic  Affect: Congruent to stated mood.   Thought process: Goal-directed.  Organized.  Thought content: No paranoia, delusion or ideas of reference. No AVH   Suicidal ideation: denied.  Homicidal ideation: denied.   Insight: Good  Judgment: Good  Fund of knowledge: Above Average      Betsy Santacruz Psy.D.  Pronouns - she  her  hers  Licensed Clinical Psychologist    [facility] [place]   [facility]  Phone: [phone]  [email or web address]

## 2024-03-01 ENCOUNTER — LAB REQUISITION (OUTPATIENT)
Dept: LAB | Facility: HOSPITAL | Age: 29
End: 2024-03-01
Payer: COMMERCIAL

## 2024-03-01 DIAGNOSIS — Z20.2 CONTACT WITH AND (SUSPECTED) EXPOSURE TO INFECTIONS WITH A PREDOMINANTLY SEXUAL MODE OF TRANSMISSION: ICD-10-CM

## 2024-03-01 DIAGNOSIS — R10.9 UNSPECIFIED ABDOMINAL PAIN: ICD-10-CM

## 2024-03-01 PROCEDURE — 87661 TRICHOMONAS VAGINALIS AMPLIF: CPT

## 2024-03-01 PROCEDURE — 87491 CHLMYD TRACH DNA AMP PROBE: CPT

## 2024-03-01 PROCEDURE — 87800 DETECT AGNT MULT DNA DIREC: CPT

## 2024-03-01 PROCEDURE — 87591 N.GONORRHOEAE DNA AMP PROB: CPT

## 2024-03-02 LAB
C TRACH RRNA SPEC QL NAA+PROBE: NEGATIVE
N GONORRHOEA DNA SPEC QL PROBE+SIG AMP: NEGATIVE
T VAGINALIS RRNA SPEC QL NAA+PROBE: NEGATIVE

## 2024-03-05 ENCOUNTER — DOCUMENTATION (OUTPATIENT)
Dept: SURGERY | Facility: HOSPITAL | Age: 29
End: 2024-03-05
Payer: COMMERCIAL

## 2024-03-21 ENCOUNTER — APPOINTMENT (OUTPATIENT)
Dept: SURGERY | Facility: CLINIC | Age: 29
End: 2024-03-21
Payer: COMMERCIAL

## 2024-04-02 ENCOUNTER — CLINICAL SUPPORT (OUTPATIENT)
Dept: SLEEP MEDICINE | Facility: CLINIC | Age: 29
End: 2024-04-02
Payer: COMMERCIAL

## 2024-04-02 DIAGNOSIS — G47.33 OBSTRUCTIVE SLEEP APNEA (ADULT) (PEDIATRIC): ICD-10-CM

## 2024-04-02 DIAGNOSIS — Z01.818 PREOPERATIVE CLEARANCE: ICD-10-CM

## 2024-04-02 DIAGNOSIS — G47.30 SLEEP-RELATED BREATHING DISORDER: ICD-10-CM

## 2024-04-02 DIAGNOSIS — Z98.84 BARIATRIC SURGERY STATUS: ICD-10-CM

## 2024-04-02 PROCEDURE — 95810 POLYSOM 6/> YRS 4/> PARAM: CPT | Performed by: INTERNAL MEDICINE

## 2024-04-03 VITALS
SYSTOLIC BLOOD PRESSURE: 114 MMHG | DIASTOLIC BLOOD PRESSURE: 79 MMHG | HEIGHT: 62 IN | BODY MASS INDEX: 46.66 KG/M2 | WEIGHT: 253.53 LBS

## 2024-04-03 ASSESSMENT — SLEEP AND FATIGUE QUESTIONNAIRES
HOW LIKELY ARE YOU TO NOD OFF OR FALL ASLEEP WHILE SITTING AND READING: MODERATE CHANCE OF DOZING
HOW LIKELY ARE YOU TO NOD OFF OR FALL ASLEEP WHEN YOU ARE A PASSENGER IN A CAR FOR AN HOUR WITHOUT A BREAK: WOULD NEVER DOZE
HOW LIKELY ARE YOU TO NOD OFF OR FALL ASLEEP WHILE LYING DOWN TO REST IN THE AFTERNOON WHEN CIRCUMSTANCES PERMIT: WOULD NEVER DOZE
SITING INACTIVE IN A PUBLIC PLACE LIKE A CLASS ROOM OR A MOVIE THEATER: MODERATE CHANCE OF DOZING
HOW LIKELY ARE YOU TO NOD OFF OR FALL ASLEEP WHILE WATCHING TV: MODERATE CHANCE OF DOZING
HOW LIKELY ARE YOU TO NOD OFF OR FALL ASLEEP WHILE SITTING QUIETLY AFTER LUNCH WITHOUT ALCOHOL: SLIGHT CHANCE OF DOZING
HOW LIKELY ARE YOU TO NOD OFF OR FALL ASLEEP WHILE SITTING AND TALKING TO SOMEONE: WOULD NEVER DOZE
ESS-CHAD TOTAL SCORE: 7
HOW LIKELY ARE YOU TO NOD OFF OR FALL ASLEEP IN A CAR, WHILE STOPPED FOR A FEW MINUTES IN TRAFFIC: WOULD NEVER DOZE

## 2024-04-03 NOTE — PROGRESS NOTES
[facility] TECH NOTE:     Patient: Nhi Hua   MRN//AGE: [id number]  1995  28 y.o.   Technologist: Mercedes Abbasi   Room: 3   Service Date: 4/3/2024        Sleep Testing Location: [facility]:     TECHNOLOGIST SLEEP STUDY PROCEDURE NOTE:   This sleep study is being conducted according to the policies and procedures outlined by the AAS accreditation standards.  The sleep study procedure and processes involved during this appointment was explained to the patient/patient’s family, questions were answered. The patient/family verbalized understanding.      The patient is a 28 y.o. year old female scheduled for a Split Night Study  with montage of:  standard . she arrived for her appointment.      The study that was ultimately completed was a diagnostic PSG  with montage of:  standard .    The full study Was completed.  Patient questionnaires completed?: yes     Consents signed? yes    Initial Fall Risk Screening:     Nhi has not fallen in the last 6 months. her did not result in injury. Nhi does not have a fear of falling. He does not need assistance with sitting, standing, or walking. she does not need assistance walking in her home. she does not need assistance in an unfamiliar setting. The patient is notusing an assistive device.     Brief Study observations: The patient did have some difficulty maintaining sleep tonight due to being mostly a prone sleeper. She tried to demonstrate supine sleep tonight.      Deviation to order/protocol and reason: The patient presents as a Split night study, AHI=15, needs testing prior to bariatric surgery procedure. She did not meet split criteria tonight. Study will be completed as a diagnostic PSG..       If PAP, which was preferred mask/pressure/mode: N/A      Other:None    After the procedure, the patient/family was informed to ensure followup with ordering clinician for testing results.      Technologist: Mercedes Abbasi

## 2024-04-09 NOTE — PROGRESS NOTES
[facility]  Followup Virtual Visit Note        Virtual or Telephone Consent  An interactive audio and video telecommunication system which permits real time communications between the patient (at the originating site) and provider (at the distant site) was utilized to provide this telehealth service.   Verbal consent was requested and obtained from Nhi Hua on this date, 04/10/24 for a telehealth visit.     HISTORY OF PRESENT ILLNESS     The patient's referring provider is: No ref. provider found    HISTORY OF PRESENT ILLNESS   Nhi Hua is a 28 y.o. female who presents to a [facility] for followup.     The patient  has a past medical history of Asthma..      Current History    Her sleep study was consistent with very mild RENETTA with AHI of 8. Spo2 Óscar of 91%. She slept on her back and sides although she mostly sleeps prone.      Previous visit 02/22/2024  OBSTRUCTIVE SLEEP APNEA, SUSPECTED / EDS / SURGERY STATUS  -Ordering in-lab sleep test  -Discussed symptoms and risks of untreated sleep apnea     BMI>45  -Body mass index is 46.46 kg/m².  Today  -Pursuing gastric bypass with Dr. Gonzalez  -Discussed with sufficient weight loss may no longer require treatment for RENETTA     Followup 3 weeks after sleep study to review results     Sleep Scales:  ESS: 2         REVIEW OF SYSTEMS     All other systems negative      ALLERGIES AND MEDICATIONS     ALLERGIES  Allergies   Allergen Reactions    Penicillins Anaphylaxis, Rash and Itching       MEDICATIONS: She has a current medication list which includes the following prescription(s): albuterol - Inhale 2 puffs every 4 hours if needed, cyanocobalamin - Take 1 tablet (1,000 mcg) by mouth once daily, fluticasone - Inhale 2 puffs twice a day, and pnv no.163-iron-folate no.10 - Take 1 tablet by mouth once daily.    PAST MEDICAL HISTORY : She  has a past medical history of Asthma.    PAST SURGICAL HISTORY: She   "has a past surgical history that includes Dilation and curettage of uterus.     FAMILY HISTORY: No changes since previous visit. Otherwise non-contributory as charted.       SOCIAL HISTORY  She  reports that she has never smoked. She has never used smokeless tobacco. She reports that she does not currently use alcohol after a past usage of about 10.0 standard drinks of alcohol per week. She reports current drug use. Frequency: 7.00 times per week. Drug: Marijuana.       PHYSICAL EXAM     VITAL SIGNS: There were no vitals taken for this visit.     CURRENT WEIGHT: [unfilled]  BMI: [unfilled]  PREVIOUS WEIGHTS:  Wt Readings from Last 3 Encounters:   04/10/24 115 kg (253 lb)   04/03/24 115 kg (253 lb 8.5 oz)   02/22/24 115 kg (254 lb)       Constitutional: Alert and oriented, cooperative, no obvious distress   Neuromuscular: Cranial nerves grossly intact, EOMs intact    RESULTS/DATA     No results found for: \"IRON\", \"TRANSFERRIN\", \"IRONSAT\", \"TIBC\", \"FERRITIN\"      ASSESSMENT/PLAN     Ms. Hua is a 28 y.o. female and  has a past medical history of Asthma. She returns in followup to the [facility] for the following issues:    OBSTRUCTIVE SLEEP APNEA, mild  -No need for CPAP at this time  -RENETTA will likely resolve with wt loss    BMI>45  -Last BMI 46  -Pursuing gastric bypass with Dr. Gonzalez    Follow up as needed         "

## 2024-04-10 ENCOUNTER — NUTRITION (OUTPATIENT)
Dept: SURGERY | Facility: CLINIC | Age: 29
End: 2024-04-10
Payer: COMMERCIAL

## 2024-04-10 ENCOUNTER — OFFICE VISIT (OUTPATIENT)
Dept: SLEEP MEDICINE | Facility: CLINIC | Age: 29
End: 2024-04-10
Payer: COMMERCIAL

## 2024-04-10 VITALS — BODY MASS INDEX: 46.26 KG/M2 | WEIGHT: 253 LBS

## 2024-04-10 DIAGNOSIS — E66.01 MORBID OBESITY (MULTI): ICD-10-CM

## 2024-04-10 DIAGNOSIS — Z98.84 BARIATRIC SURGERY STATUS: Primary | ICD-10-CM

## 2024-04-10 DIAGNOSIS — Z01.818 PREOPERATIVE CLEARANCE: ICD-10-CM

## 2024-04-10 PROCEDURE — 99213 OFFICE O/P EST LOW 20 MIN: CPT | Performed by: PHYSICIAN ASSISTANT

## 2024-04-10 PROCEDURE — 1036F TOBACCO NON-USER: CPT | Performed by: PHYSICIAN ASSISTANT

## 2024-04-10 ASSESSMENT — LIFESTYLE VARIABLES
SKIP TO QUESTIONS 9-10: 0
AUDIT-C TOTAL SCORE: 5
HOW OFTEN DO YOU HAVE SIX OR MORE DRINKS ON ONE OCCASION: NEVER
HOW MANY STANDARD DRINKS CONTAINING ALCOHOL DO YOU HAVE ON A TYPICAL DAY: 3 OR 4
HOW OFTEN DO YOU HAVE A DRINK CONTAINING ALCOHOL: 4 OR MORE TIMES A WEEK

## 2024-04-10 ASSESSMENT — SLEEP AND FATIGUE QUESTIONNAIRES
SITING INACTIVE IN A PUBLIC PLACE LIKE A CLASS ROOM OR A MOVIE THEATER: WOULD NEVER DOZE
HOW LIKELY ARE YOU TO NOD OFF OR FALL ASLEEP WHILE SITTING AND TALKING TO SOMEONE: WOULD NEVER DOZE
HOW LIKELY ARE YOU TO NOD OFF OR FALL ASLEEP WHILE SITTING QUIETLY AFTER LUNCH WITHOUT ALCOHOL: WOULD NEVER DOZE
HOW LIKELY ARE YOU TO NOD OFF OR FALL ASLEEP WHILE LYING DOWN TO REST IN THE AFTERNOON WHEN CIRCUMSTANCES PERMIT: SLIGHT CHANCE OF DOZING
HOW LIKELY ARE YOU TO NOD OFF OR FALL ASLEEP WHILE WATCHING TV: SLIGHT CHANCE OF DOZING
HOW LIKELY ARE YOU TO NOD OFF OR FALL ASLEEP WHEN YOU ARE A PASSENGER IN A CAR FOR AN HOUR WITHOUT A BREAK: WOULD NEVER DOZE
ESS-CHAD TOTAL SCORE: 2
HOW LIKELY ARE YOU TO NOD OFF OR FALL ASLEEP WHILE SITTING AND READING: WOULD NEVER DOZE
HOW LIKELY ARE YOU TO NOD OFF OR FALL ASLEEP IN A CAR, WHILE STOPPED FOR A FEW MINUTES IN TRAFFIC: WOULD NEVER DOZE

## 2024-04-10 NOTE — PROGRESS NOTES
Follow-up Pre-op Bariatric Nutrition Assessment    Surgeon:   Carlos   Patient is considering: RNYGB     ASSESSMENT:  Current weight:   Vitals:    04/10/24 1326   Weight: 115 kg (253 lb)     Ht:      BMI:  Body mass index is 46.26 kg/m².        Initial start weight:   #254  Pre-Op Excess Body Weight (EBW):   #144    Target Post-Op weight goal:  #139-167       Nutrition Interventions for last encounter (date): 2-  Continue with the structure meal patterns, eating three meals and 1-2 snacks per day.  2. Continue to have a good source of protein first at each meal & snack.  Aim for 60-70 grams/day. Eat in this order: protein first, veggies/fruit second and starches last   3. Continue to drink 64oz of calorie-free, caffeine-free, and non-carbonated beverages- water, flavored water,  diluted juice    5. Continue to work on these: Stop drinking 30 minutes before meals, nothing with meals and wait 30 minutes after meals to drink again. Make meals last 30 minutes-chew thoroughly.   6... Continue with prenatal MVI, vitamin B12, iron. OK to start this one post-op:  1200-1500mg of calcium citrate per day (500-600mg at lunch, dinner AND before bed),- chewable form    7. Continue to wear the apple watch-trying to close the rings via work or gym    8. Start the pre-op diet 2 weeks before surgery     24 HOUR RECALL/DIET HISTORY:  Breakfast:  fruit smoothie and yogurt (regular or high protein)  Snack:  none   Lunch: tuna and crackers   Snack: none or PB with apple or plain   Dinner: baked chicken/fish, with starch and vegetables   Snack: none   Beverages: water  Alcohol: None        READINESS TO LEARN:  Motivation to learn: Interested        Understanding of instruction: Good      Anticipated Compliance: Good        Family Support: none           Educational Materials Provided:    None     Nutrition follow-up assessment completed today. Patient is seeking RNYGB. Main fluid is water and following the 30-30-30 rule well.  Movement is walking at work, stepper at gym (2 times a week)- still closing all  apple rings. Reviewed again the 2 week pre-op diet. Patient is taking chewable  calcium (once a day), iron, vitamin b12 and prenatal MVI. Meals remain consistent- three meals a day. Protein at all meals. Overall, not snacking. Provider plans for nutrition clearance at next appt in May. Reviewed today the first 2 weeks post-op.     Patient was receptive to nutritional recommendations, asked numerous questions, and verbalized understanding of the weight loss surgery diet.  Patient expressed understanding about the importance of strict dietary compliance post-surgery to avoid nutritional deficiencies and achieve optimal weight loss and verbalized intent to follow dietary recommendations.    Malnutrition Screening:   Significant unintentional weight loss? n/a   Eating less than 75% of usual intake for more than 2 weeks? n/a      Nutrition Diagnosis:   Overweight/obesity related to excess energy intake as evidenced by BMI >= 40 kg/m^2.  Food- and nutrition-related knowledge deficit related to lack of prior exposure to surgical weight loss information as evidenced by pt new to surgical program.    Nutrition Interventions:   Modify type and amount of food and nutrients within meals and snacks.  Comprehensive Nutrition Education    Recommendations:  Continue with structure meal patterns, eating three meals and 1-2 snacks per day.  2. Continue to have a good source of protein first at each meal & snack.  Aim for 60-70 grams/day. Eat in this order: protein first, veggies/fruit second and starches last   3. Continue to drink 64oz of calorie-free, caffeine-free, and non-carbonated beverages-water.   4. Continue  to follow these: Stop drinking 30 minutes before meals, nothing with meals and wait 30 minutes after meals to drink again. Make meals last 30 minutes-chew thoroughly.   5.  Continue with prenatal MVI, vitamin B12, iron.   Make sure to  take(1200-1500mg of calcium citrate per day (500-600mg at lunch, dinner AND before bed),- chewable form    7. Continue to close rings- walking at work, stepper at gym.  8. Start the pre-op diet 2 weeks before surgery and follow the post-op diet progression after surgery    Pre-op Goal weight: lose 5% of body weight    Nutrition Monitoring and Evaluation: 1-2 pound weight loss per week  Criteria: weight check  Need for Follow-up: 4 weeks (review all goals)     Patient does meet National Institutes Health guidelines for weight loss surgery, however needs to demonstrate consistent effort in making dietary changes before giving clearance. It is anticipated that the patient will need at least 1  nutritional follow-up visits prior to clearance for surgery.        Nadege Prado RDN, LD

## 2024-04-17 ENCOUNTER — OFFICE VISIT (OUTPATIENT)
Dept: BEHAVIORAL HEALTH | Facility: CLINIC | Age: 29
End: 2024-04-17
Payer: COMMERCIAL

## 2024-04-17 DIAGNOSIS — F12.20 CANNABIS USE DISORDER, SEVERE (MULTI): ICD-10-CM

## 2024-04-17 DIAGNOSIS — F10.20 ALCOHOL USE DISORDER, MODERATE (MULTI): ICD-10-CM

## 2024-04-17 DIAGNOSIS — Z76.89 ENCOUNTER FOR ASSESSMENT OF ALCOHOL AND DRUG USE: Primary | ICD-10-CM

## 2024-04-17 LAB
AMPHETAMINES UR QL SCN: ABNORMAL
BARBITURATES UR QL SCN: ABNORMAL
BENZODIAZ UR QL SCN: ABNORMAL
BZE UR QL SCN: ABNORMAL
CANNABINOIDS UR QL SCN: ABNORMAL
FENTANYL+NORFENTANYL UR QL SCN: ABNORMAL
METHADONE UR QL SCN: ABNORMAL
OPIATES UR QL SCN: ABNORMAL
OXYCODONE+OXYMORPHONE UR QL SCN: ABNORMAL
PCP UR QL SCN: ABNORMAL

## 2024-04-17 PROCEDURE — 80349 CANNABINOIDS NATURAL: CPT | Performed by: SOCIAL WORKER

## 2024-04-17 PROCEDURE — 80307 DRUG TEST PRSMV CHEM ANLYZR: CPT | Performed by: SOCIAL WORKER

## 2024-04-17 PROCEDURE — 80321 ALCOHOLS BIOMARKERS 1OR 2: CPT | Performed by: SOCIAL WORKER

## 2024-04-17 PROCEDURE — 90791 PSYCH DIAGNOSTIC EVALUATION: CPT | Mod: HE | Performed by: SOCIAL WORKER
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
## 2024-04-18 ENCOUNTER — APPOINTMENT (OUTPATIENT)
Dept: SURGERY | Facility: CLINIC | Age: 29
End: 2024-04-18
Payer: COMMERCIAL

## 2024-04-19 LAB — ETHYL GLUCURONIDE UR QL SCN: NORMAL NG/ML

## 2024-04-22 LAB — CARBOXYTHC UR-MCNC: >500 NG/ML

## 2024-04-23 ENCOUNTER — DOCUMENTATION (OUTPATIENT)
Dept: BEHAVIORAL HEALTH | Facility: CLINIC | Age: 29
End: 2024-04-23
Payer: COMMERCIAL

## 2024-04-23 NOTE — PROGRESS NOTES
Clinician called and left message to return call about starting IOP on Wednesday, 4-24-24. [facility] provided phone number as well.

## 2024-04-24 ENCOUNTER — APPOINTMENT (OUTPATIENT)
Dept: BEHAVIORAL HEALTH | Facility: CLINIC | Age: 29
End: 2024-04-24
Payer: COMMERCIAL

## 2024-04-24 LAB
ETHYL GLUCURONIDE UR CFM-MCNC: 825 NG/ML
ETHYL SULFATE UR CFM-MCNC: 200 NG/ML

## 2024-04-26 ENCOUNTER — APPOINTMENT (OUTPATIENT)
Dept: BEHAVIORAL HEALTH | Facility: CLINIC | Age: 29
End: 2024-04-26
Payer: COMMERCIAL

## 2024-04-29 ENCOUNTER — APPOINTMENT (OUTPATIENT)
Dept: BEHAVIORAL HEALTH | Facility: CLINIC | Age: 29
End: 2024-04-29
Payer: COMMERCIAL

## 2024-05-01 ENCOUNTER — APPOINTMENT (OUTPATIENT)
Dept: BEHAVIORAL HEALTH | Facility: CLINIC | Age: 29
End: 2024-05-01
Payer: COMMERCIAL

## 2024-05-03 ENCOUNTER — APPOINTMENT (OUTPATIENT)
Dept: BEHAVIORAL HEALTH | Facility: CLINIC | Age: 29
End: 2024-05-03
Payer: COMMERCIAL

## 2024-05-08 ENCOUNTER — LAB (OUTPATIENT)
Dept: LAB | Facility: LAB | Age: 29
End: 2024-05-08
Payer: COMMERCIAL

## 2024-05-08 DIAGNOSIS — Z13.21 ENCOUNTER FOR VITAMIN DEFICIENCY SCREENING: ICD-10-CM

## 2024-05-08 DIAGNOSIS — Z01.818 PREOPERATIVE CLEARANCE: ICD-10-CM

## 2024-05-08 DIAGNOSIS — E66.01 MORBID OBESITY (MULTI): ICD-10-CM

## 2024-05-08 DIAGNOSIS — Z98.84 BARIATRIC SURGERY STATUS: ICD-10-CM

## 2024-05-08 LAB
25(OH)D3 SERPL-MCNC: 41 NG/ML (ref 30–100)
ALBUMIN SERPL BCP-MCNC: 4.1 G/DL (ref 3.4–5)
ALP SERPL-CCNC: 56 U/L (ref 33–110)
ALT SERPL W P-5'-P-CCNC: 16 U/L (ref 7–45)
ANION GAP SERPL CALC-SCNC: 13 MMOL/L (ref 10–20)
APTT PPP: 30 SECONDS (ref 27–38)
AST SERPL W P-5'-P-CCNC: 14 U/L (ref 9–39)
BASOPHILS # BLD AUTO: 0.02 X10*3/UL (ref 0–0.1)
BASOPHILS NFR BLD AUTO: 0.4 %
BILIRUB SERPL-MCNC: 0.6 MG/DL (ref 0–1.2)
BUN SERPL-MCNC: 12 MG/DL (ref 6–23)
CALCIUM SERPL-MCNC: 9.3 MG/DL (ref 8.6–10.6)
CHLORIDE SERPL-SCNC: 106 MMOL/L (ref 98–107)
CHOLEST SERPL-MCNC: 157 MG/DL (ref 0–199)
CHOLESTEROL/HDL RATIO: 3.4
CO2 SERPL-SCNC: 26 MMOL/L (ref 21–32)
CREAT SERPL-MCNC: 0.96 MG/DL (ref 0.5–1.05)
EGFRCR SERPLBLD CKD-EPI 2021: 83 ML/MIN/1.73M*2
EOSINOPHIL # BLD AUTO: 0.03 X10*3/UL (ref 0–0.7)
EOSINOPHIL NFR BLD AUTO: 0.6 %
ERYTHROCYTE [DISTWIDTH] IN BLOOD BY AUTOMATED COUNT: 13.2 % (ref 11.5–14.5)
EST. AVERAGE GLUCOSE BLD GHB EST-MCNC: 103 MG/DL
FERRITIN SERPL-MCNC: 113 NG/ML (ref 8–150)
FOLATE SERPL-MCNC: 10.9 NG/ML
GLUCOSE SERPL-MCNC: 80 MG/DL (ref 74–99)
HBA1C MFR BLD: 5.2 %
HCT VFR BLD AUTO: 38.6 % (ref 36–46)
HDLC SERPL-MCNC: 46.6 MG/DL
HGB BLD-MCNC: 12.2 G/DL (ref 12–16)
IMM GRANULOCYTES # BLD AUTO: 0 X10*3/UL (ref 0–0.7)
IMM GRANULOCYTES NFR BLD AUTO: 0 % (ref 0–0.9)
INR PPP: 1 (ref 0.9–1.1)
IRON SATN MFR SERPL: 28 % (ref 25–45)
IRON SERPL-MCNC: 91 UG/DL (ref 35–150)
LDLC SERPL CALC-MCNC: 93 MG/DL
LYMPHOCYTES # BLD AUTO: 1.89 X10*3/UL (ref 1.2–4.8)
LYMPHOCYTES NFR BLD AUTO: 37.7 %
MCH RBC QN AUTO: 27.8 PG (ref 26–34)
MCHC RBC AUTO-ENTMCNC: 31.6 G/DL (ref 32–36)
MCV RBC AUTO: 88 FL (ref 80–100)
MONOCYTES # BLD AUTO: 0.4 X10*3/UL (ref 0.1–1)
MONOCYTES NFR BLD AUTO: 8 %
NEUTROPHILS # BLD AUTO: 2.67 X10*3/UL (ref 1.2–7.7)
NEUTROPHILS NFR BLD AUTO: 53.3 %
NON HDL CHOLESTEROL: 110 MG/DL (ref 0–149)
NRBC BLD-RTO: 0 /100 WBCS (ref 0–0)
PLATELET # BLD AUTO: 245 X10*3/UL (ref 150–450)
POTASSIUM SERPL-SCNC: 4 MMOL/L (ref 3.5–5.3)
PROT SERPL-MCNC: 7 G/DL (ref 6.4–8.2)
PROTHROMBIN TIME: 11.6 SECONDS (ref 9.8–12.8)
PTH-INTACT SERPL-MCNC: 69 PG/ML (ref 18.5–88)
RBC # BLD AUTO: 4.39 X10*6/UL (ref 4–5.2)
SODIUM SERPL-SCNC: 141 MMOL/L (ref 136–145)
T4 FREE SERPL-MCNC: 1.1 NG/DL (ref 0.78–1.48)
TIBC SERPL-MCNC: 323 UG/DL (ref 240–445)
TRIGL SERPL-MCNC: 88 MG/DL (ref 0–149)
TSH SERPL-ACNC: 1.99 MIU/L (ref 0.44–3.98)
UIBC SERPL-MCNC: 232 UG/DL (ref 110–370)
VIT B12 SERPL-MCNC: 479 PG/ML (ref 211–911)
VLDL: 18 MG/DL (ref 0–40)
WBC # BLD AUTO: 5 X10*3/UL (ref 4.4–11.3)

## 2024-05-08 PROCEDURE — 85025 COMPLETE CBC W/AUTO DIFF WBC: CPT

## 2024-05-08 PROCEDURE — 80061 LIPID PANEL: CPT

## 2024-05-08 PROCEDURE — 82607 VITAMIN B-12: CPT

## 2024-05-08 PROCEDURE — 84681 ASSAY OF C-PEPTIDE: CPT

## 2024-05-08 PROCEDURE — 83550 IRON BINDING TEST: CPT

## 2024-05-08 PROCEDURE — 82306 VITAMIN D 25 HYDROXY: CPT

## 2024-05-08 PROCEDURE — 85730 THROMBOPLASTIN TIME PARTIAL: CPT

## 2024-05-08 PROCEDURE — 36415 COLL VENOUS BLD VENIPUNCTURE: CPT

## 2024-05-08 PROCEDURE — 83036 HEMOGLOBIN GLYCOSYLATED A1C: CPT

## 2024-05-08 PROCEDURE — 80053 COMPREHEN METABOLIC PANEL: CPT

## 2024-05-08 PROCEDURE — 83540 ASSAY OF IRON: CPT

## 2024-05-08 PROCEDURE — 82728 ASSAY OF FERRITIN: CPT

## 2024-05-08 PROCEDURE — 82746 ASSAY OF FOLIC ACID SERUM: CPT

## 2024-05-08 PROCEDURE — 84439 ASSAY OF FREE THYROXINE: CPT

## 2024-05-08 PROCEDURE — 83970 ASSAY OF PARATHORMONE: CPT

## 2024-05-08 PROCEDURE — 84425 ASSAY OF VITAMIN B-1: CPT

## 2024-05-08 PROCEDURE — 84443 ASSAY THYROID STIM HORMONE: CPT

## 2024-05-08 PROCEDURE — 84590 ASSAY OF VITAMIN A: CPT

## 2024-05-08 PROCEDURE — 85610 PROTHROMBIN TIME: CPT

## 2024-05-08 PROCEDURE — 80323 ALKALOIDS NOS: CPT

## 2024-05-09 ENCOUNTER — PATIENT MESSAGE (OUTPATIENT)
Dept: SURGERY | Facility: CLINIC | Age: 29
End: 2024-05-09
Payer: COMMERCIAL

## 2024-05-09 DIAGNOSIS — Z98.84 BARIATRIC SURGERY STATUS: ICD-10-CM

## 2024-05-09 DIAGNOSIS — Z01.818 PREOPERATIVE CLEARANCE: ICD-10-CM

## 2024-05-09 LAB — C PEPTIDE SERPL-MCNC: 3 NG/ML (ref 0.7–3.9)

## 2024-05-11 LAB
ANNOTATION COMMENT IMP: NORMAL
RETINYL PALMITATE SERPL-MCNC: <0.02 MG/L (ref 0–0.1)
VIT A SERPL-MCNC: 0.73 MG/L (ref 0.3–1.2)

## 2024-05-12 LAB
COTININE SERPL-MCNC: <5 NG/ML
NICOTINE SERPL-MCNC: <5 NG/ML

## 2024-05-13 DIAGNOSIS — Z13.21 ENCOUNTER FOR VITAMIN DEFICIENCY SCREENING: ICD-10-CM

## 2024-05-13 DIAGNOSIS — Z01.818 PREOPERATIVE CLEARANCE: ICD-10-CM

## 2024-05-13 DIAGNOSIS — Z98.84 BARIATRIC SURGERY STATUS: ICD-10-CM

## 2024-05-13 DIAGNOSIS — E66.01 MORBID OBESITY (MULTI): ICD-10-CM

## 2024-05-14 ENCOUNTER — OFFICE VISIT (OUTPATIENT)
Dept: PULMONOLOGY | Facility: CLINIC | Age: 29
End: 2024-05-14
Payer: COMMERCIAL

## 2024-05-14 ENCOUNTER — LAB REQUISITION (OUTPATIENT)
Dept: LAB | Facility: HOSPITAL | Age: 29
End: 2024-05-14
Payer: COMMERCIAL

## 2024-05-14 ENCOUNTER — LAB (OUTPATIENT)
Dept: LAB | Facility: LAB | Age: 29
End: 2024-05-14
Payer: COMMERCIAL

## 2024-05-14 VITALS
TEMPERATURE: 97.5 F | HEART RATE: 68 BPM | SYSTOLIC BLOOD PRESSURE: 102 MMHG | DIASTOLIC BLOOD PRESSURE: 70 MMHG | RESPIRATION RATE: 18 BRPM | HEIGHT: 62 IN | BODY MASS INDEX: 44.16 KG/M2 | WEIGHT: 240 LBS | OXYGEN SATURATION: 99 %

## 2024-05-14 DIAGNOSIS — R30.0 DYSURIA: ICD-10-CM

## 2024-05-14 DIAGNOSIS — Z13.21 ENCOUNTER FOR VITAMIN DEFICIENCY SCREENING: ICD-10-CM

## 2024-05-14 DIAGNOSIS — J45.20 MILD INTERMITTENT ASTHMA WITHOUT COMPLICATION (HHS-HCC): Primary | ICD-10-CM

## 2024-05-14 DIAGNOSIS — Z01.818 PREOPERATIVE CLEARANCE: ICD-10-CM

## 2024-05-14 DIAGNOSIS — E66.01 MORBID OBESITY (MULTI): ICD-10-CM

## 2024-05-14 DIAGNOSIS — Z98.84 BARIATRIC SURGERY STATUS: ICD-10-CM

## 2024-05-14 LAB — VIT B1 PYROPHOSHATE BLD-SCNC: 54 NMOL/L (ref 70–180)

## 2024-05-14 PROCEDURE — 99213 OFFICE O/P EST LOW 20 MIN: CPT | Performed by: INTERNAL MEDICINE

## 2024-05-14 PROCEDURE — 80307 DRUG TEST PRSMV CHEM ANLYZR: CPT

## 2024-05-14 PROCEDURE — 87086 URINE CULTURE/COLONY COUNT: CPT

## 2024-05-14 PROCEDURE — 83013 H PYLORI (C-13) BREATH: CPT

## 2024-05-14 PROCEDURE — 1036F TOBACCO NON-USER: CPT | Performed by: INTERNAL MEDICINE

## 2024-05-14 PROCEDURE — 80349 CANNABINOIDS NATURAL: CPT

## 2024-05-14 PROCEDURE — 36415 COLL VENOUS BLD VENIPUNCTURE: CPT

## 2024-05-14 PROCEDURE — 84630 ASSAY OF ZINC: CPT

## 2024-05-14 RX ORDER — FLUTICASONE FUROATE 100 UG/1
1 POWDER RESPIRATORY (INHALATION) DAILY
Qty: 1 EACH | Refills: 5 | Status: SHIPPED | OUTPATIENT
Start: 2024-05-14 | End: 2024-11-10

## 2024-05-14 ASSESSMENT — ASTHMA QUESTIONNAIRES
QUESTION_1 LAST FOUR WEEKS HOW MUCH OF THE TIME DID YOUR ASTHMA KEEP YOU FROM GETTING AS MUCH DONE AT WORK, SCHOOL OR AT HOME: NONE OF THE TIME
QUESTION_5 LAST FOUR WEEKS HOW WOULD YOU RATE YOUR ASTHMA CONTROL: WELL CONTROLLED
QUESTION_4 LAST FOUR WEEKS HOW OFTEN HAVE YOU USED YOUR RESCUE INHALER OR NEBULIZER MEDICATION (SUCH AS ALBUTEROL): ONCE A WEEK OR LESS
QUESTION_2 LAST FOUR WEEKS HOW OFTEN HAVE YOU HAD SHORTNESS OF BREATH: NOT AT ALL
QUESTION_3 LAST FOUR WEEKS HOW OFTEN DID YOUR ASTHMA SYMPTOMS (WHEEZING, COUGHING, SHORTNESS OF BREATH, CHEST TIGHTNESS OR PAIN) WAKE YOU UP AT NIGHT OR EARLIER THAN USUAL IN THE MORNING: NOT AT ALL
ACT_TOTALSCORE: 23

## 2024-05-14 ASSESSMENT — PAIN SCALES - GENERAL: PAINLEVEL: 0-NO PAIN

## 2024-05-14 NOTE — PROGRESS NOTES
Department of Medicine  Division of Pulmonary, Critical Care, and Sleep Medicine  Follow Up  [facility], [address]    Nhi Hua returns as a follow up for bariatric surgical clearance and asthma. Last visit was on 2024.    Physician HPI (2024):  28 y.o. year-old female with mild intermittent asthma. She currently denies any shortness of breath.  Sleep study showed mild RENETTA, and after following up with sleep medicine, CPAP was not advised.  She is only intermittently using her Flovent.  She has not received a notification that she needs to refill it or that the prescription has .  The only thing pending for her bariatric surgery is group therapy for marijuana use.    She is able to exercise without issues.  She has a significant family history of asthma.  She does smoke marijuana but denies any tobacco use.  She had 2 prior surgeries requiring general anesthesia, and did well postoperatively without any pulmonary complications.     I have personally reviewed PMH, PSH, Family History in the HISTORY tab of this chart, and unless noted above are not pertinent to the presenting complaint.  I have personally reviewed Social History as provided in the electronic medical record.    Immunization History:    There is no immunization history on file for this patient.    Current Medications:  Current Outpatient Medications   Medication Instructions    albuterol 90 mcg/actuation inhaler 2 puffs, inhalation, Every 4 hours PRN    cyanocobalamin (VITAMIN B-12) 1,000 mcg, oral, Daily RT    fluticasone (Flovent) 110 mcg/actuation inhaler 2 puffs, inhalation, 2 times daily    PNV no.163-iron-folate no.10 20 mg iron- 1 mg tablet 1 tablet, oral, Daily RT        Drug Allergies/Intolerances:  Allergies   Allergen Reactions    Penicillins Anaphylaxis, Rash and Itching        Review of Systems:  Review of Systems     All other review of systems are negative and/or non-contributory.    Physical  "Examination:  Vitals:    05/14/24 1255   BP: 102/70   BP Location: Right arm   Patient Position: Sitting   Pulse: 68   Resp: 18   Temp: 36.4 °C (97.5 °F)   TempSrc: Temporal   SpO2: 99%   Weight: 109 kg (240 lb)   Height: 1.575 m (5' 2\")          GEN: appears well  CV: RRR, no m/g/r  LUNGS: good effort, clear bilaterally, no w/r/r  EXT: no edema, cyanosis, clubbing        Exacerbation History     None    Pulmonary Function Test Results     None    Sleep study     4/2/2024:  RDI3% 8.6  SpO2 johnnie 91%    Chest Radiograph     None    Chest CT Scan     None    Labs     Lab Results   Component Value Date    WBC 5.0 05/08/2024    HGB 12.2 05/08/2024    HCT 38.6 05/08/2024    MCV 88 05/08/2024     05/08/2024     No results found for: \"BNP\"  Lab Results   Component Value Date    EOSABS 0.03 05/08/2024       Echocardiogram     No results found for this or any previous visit from the past 365 days.       ASSESSMENT & PLAN     Problem List Items Addressed This Visit       Asthma ([facility]) - Primary    Relevant Medications    fluticasone furoate (Arnuity Ellipta) 100 mcg/actuation inhaler    Bariatric surgery status        SUMMARY:  28 y.o. year-old female with exercise-induced asthma here for bariatric preop clearance. Her asthma remains under good control.  No issues with prior anesthesia. ARISCAT score for postoperative pulmonary complications is 0 placing her in low risk (1.6%) category of respiratory failure, respiratory infection, pleural effusion, atelectasis, pneumothorax, bronchospasm, aspiration pneumonitis.     Plan:  -Encouraged to quit smoking marijuana  -Administer albuterol treatment prior to induction of anesthesia  -Continue ICS in perioperative period.  -Minimize sedatives/narcotics in perioperative period  -Sending her Rx for Arnuity Ellipta as Flovent is being discontinued and she is running low/out of refills    Follow-up: 6 months for asthma follow up      Marko Sutton DO  Staff Physician - " Pulmonary & Critical Care  05/14/24 1:02 PM  Office number: [phone]   Fax number:  [phone]

## 2024-05-15 LAB
AMPHETAMINES UR QL SCN: ABNORMAL
BACTERIA UR CULT: NORMAL
BARBITURATES UR QL SCN: ABNORMAL
BENZODIAZ UR QL SCN: ABNORMAL
BZE UR QL SCN: ABNORMAL
CANNABINOIDS UR QL SCN: ABNORMAL
FENTANYL+NORFENTANYL UR QL SCN: ABNORMAL
METHADONE UR QL SCN: ABNORMAL
OPIATES UR QL SCN: ABNORMAL
OXYCODONE+OXYMORPHONE UR QL SCN: ABNORMAL
PCP UR QL SCN: ABNORMAL
UREA BREATH TEST QL: NEGATIVE

## 2024-05-16 LAB — ETHYL GLUCURONIDE UR QL SCN: NEGATIVE NG/ML

## 2024-05-17 LAB
CARBOXYTHC UR-MCNC: >500 NG/ML
ZINC SERPL-MCNC: 50.9 UG/DL (ref 60–120)

## 2024-06-17 ENCOUNTER — TELEPHONE (OUTPATIENT)
Dept: SURGERY | Facility: CLINIC | Age: 29
End: 2024-06-17
Payer: COMMERCIAL

## 2024-06-17 DIAGNOSIS — Z13.21 ENCOUNTER FOR VITAMIN DEFICIENCY SCREENING: ICD-10-CM

## 2024-06-17 DIAGNOSIS — Z98.84 BARIATRIC SURGERY STATUS: ICD-10-CM

## 2024-06-17 DIAGNOSIS — Z01.818 PREOPERATIVE CLEARANCE: ICD-10-CM

## 2024-06-17 DIAGNOSIS — E66.01 MORBID OBESITY (MULTI): ICD-10-CM

## 2024-06-17 NOTE — TELEPHONE ENCOUNTER
Telephone call to patient to review lab recommendations from Dr. Gonzalez. OTC vitamin B1 supplementation recommended daily for 30 days then retest. If level remains low she should follow up with PCP. Also, reviewed urine tox. Recommended cessation and repeat testing to be ordered. She verbalized understanding. Encourage to call back with questions.

## 2024-06-26 ENCOUNTER — NUTRITION (OUTPATIENT)
Dept: SURGERY | Facility: CLINIC | Age: 29
End: 2024-06-26
Payer: COMMERCIAL

## 2024-06-26 VITALS — BODY MASS INDEX: 43.9 KG/M2 | WEIGHT: 240 LBS

## 2024-06-26 NOTE — PROGRESS NOTES
Follow-up Pre-op Bariatric Nutrition Assessment    Surgeon:   Carlos   Patient is considering: RNYGB    ASSESSMENT:  Current weight:   Vitals:    06/26/24 1423   Weight: 109 kg (240 lb)     Ht:      BMI:  Body mass index is 43.9 kg/m².        Initial start weight:   #254  Pre-Op Excess Body Weight (EBW):   #144    Target Post-Op weight goal:  #139-167       Nutrition Interventions for last encounter (date): 4-10-24  Continue with structure meal patterns, eating three meals and 1-2 snacks per day.  2. Continue to have a good source of protein first at each meal & snack.  Aim for 60-70 grams/day. Eat in this order: protein first, veggies/fruit second and starches last   3. Continue to drink 64oz of calorie-free, caffeine-free, and non-carbonated beverages-water.   4. Continue  to follow these: Stop drinking 30 minutes before meals, nothing with meals and wait 30 minutes after meals to drink again. Make meals last 30 minutes-chew thoroughly.   5.  Continue with prenatal MVI, vitamin B12, iron.   Make sure to take(1200-1500mg of calcium citrate per day (500-600mg at lunch, dinner AND before bed),- chewable form    7. Continue to close rings- walking at work, stepper at gym.  8. Start the pre-op diet 2 weeks before surgery and follow the post-op diet progression after surgery    24 HOUR RECALL/DIET HISTORY:  Breakfast:  skips or premier shake or granola bar   Snack:  none   Lunch: leftovers or salad with tuna   Snack: PB  or granola bar   Dinner: fish/shrimp with rice and vegetables   Snack: none   Beverages: water, juice on occasion   Alcohol: none        READINESS TO LEARN:  Motivation to learn: Interested        Understanding of instruction: Good      Anticipated Compliance: Good         Family Support: U/A          Educational Materials Provided:    none    Nutrition follow-up assessment completed today. Patient is seeking RNYGB. Weight loss of #13 past 2 1/2 months since last nutrition appointment. Stopped eating  beef- using turkey meat more often. Is taking prenatal MVI, vitamin B12, calcium , iron. Movement is 3 times a week- stopped going to gym for a bit but I now trying to go back to gym or walking more outside. Is using the correct protein shake for a meal replacement and will continue those for the 2 week pre-op diet and post-op. Protein overall is at all meals. Choosing leaner foods and avoiding sweets. Nutrition goals are being well followed. Reviewed again today the first 2 weeks post-op.     Patient was receptive to nutritional recommendations, asked numerous questions, and verbalized understanding of the weight loss surgery diet.  Patient expressed understanding about the importance of strict dietary compliance post-surgery to avoid nutritional deficiencies and achieve optimal weight loss and verbalized intent to follow dietary recommendations.    Malnutrition Screening:   Significant unintentional weight loss? n/a   Eating less than 75% of usual intake for more than 2 weeks? n/a      Nutrition Diagnosis:   Overweight/obesity related to excess energy intake as evidenced by BMI >= 40 kg/m^2.  Food- and nutrition-related knowledge deficit related to lack of prior exposure to surgical weight loss information as evidenced by pt new to surgical program.    Nutrition Interventions:   Modify type and amount of food and nutrients within meals and snacks.  Comprehensive Nutrition Education    Recommendations:  Continue with structure meal patterns, eating three meals and 1-2 snacks per day.  2. Continue to have a good source of protein first at each meal & snack.  Aim for 60-70 grams/day. Eat in this order: protein first, veggies/fruit second and starches last   3. Continue to drink 64oz of calorie-free, caffeine-free, and non-carbonated beverages-water.   4. Continue  to follow these: Stop drinking 30 minutes before meals, nothing with meals and wait 30 minutes after meals to drink again. Make meals last 30 minutes-chew  thoroughly.   5.  Continue with prenatal MVI, vitamin B12, iron.   Make sure to take(1200-1500mg of calcium citrate per day (500-600mg at lunch, dinner AND before bed),- chewable form    7. Continue to close rings- walking at work, stepper at gym.  8. Start the pre-op diet 2 weeks before surgery and follow the post-op diet progression after surgery-use the DNART LIMITADAke    Pre-op Goal weight: lose 5% of body weight    Nutrition Monitoring and Evaluation: 1-2 pound weight loss per week  Criteria: weight check  Need for Follow-up: 1-2 weeks post-op     Patient meets NIH guidelines for weight loss surgery and has been thoroughly evaluated and educated on good dietary practices. Patient is capable of following these guidelines pre-and post-surgically.  From nutrition standpoint, the patient is cleared for weight loss surgery.  If the patient desires may continue to follow-up with the dietitian on a monthly basis until all surgical requirements are met.    Nadege Prado RDN, LD

## 2024-07-26 ENCOUNTER — DOCUMENTATION (OUTPATIENT)
Dept: SURGERY | Facility: CLINIC | Age: 29
End: 2024-07-26
Payer: COMMERCIAL

## 2024-09-06 ENCOUNTER — DOCUMENTATION (OUTPATIENT)
Dept: SURGERY | Facility: HOSPITAL | Age: 29
End: 2024-09-06
Payer: COMMERCIAL

## 2024-11-15 ENCOUNTER — APPOINTMENT (OUTPATIENT)
Dept: PULMONOLOGY | Facility: CLINIC | Age: 29
End: 2024-11-15
Payer: COMMERCIAL

## 2024-11-19 ENCOUNTER — TELEPHONE (OUTPATIENT)
Dept: SURGERY | Facility: HOSPITAL | Age: 29
End: 2024-11-19
Payer: COMMERCIAL

## 2024-11-19 NOTE — TELEPHONE ENCOUNTER
Attempted to contact patient regarding interest in bariatric surgery program. Unable to reach at this time. Left voice message requesting return call with contact information provided.

## 2025-02-17 ENCOUNTER — TELEPHONE (OUTPATIENT)
Dept: SURGERY | Facility: CLINIC | Age: 30
End: 2025-02-17
Payer: COMMERCIAL

## 2025-02-17 NOTE — TELEPHONE ENCOUNTER
Telephone call regarding interest in bariatric surgery program. After discussing with patient - will move chart to drop status at this time. Encouraged to reach out should she want to pursue surgery in the future. She verbalized understanding.

## 2025-05-05 ENCOUNTER — APPOINTMENT (OUTPATIENT)
Dept: PRIMARY CARE | Facility: CLINIC | Age: 30
End: 2025-05-05
Payer: COMMERCIAL